# Patient Record
Sex: MALE | Race: WHITE | NOT HISPANIC OR LATINO | Employment: OTHER | RURAL
[De-identification: names, ages, dates, MRNs, and addresses within clinical notes are randomized per-mention and may not be internally consistent; named-entity substitution may affect disease eponyms.]

---

## 2021-08-11 ENCOUNTER — CLINICAL SUPPORT (OUTPATIENT)
Dept: AUDIOLOGY | Facility: CLINIC | Age: 61
End: 2021-08-11
Payer: OTHER GOVERNMENT

## 2021-08-11 DIAGNOSIS — H90.3 SENSORY HEARING LOSS, BILATERAL: Primary | ICD-10-CM

## 2021-08-11 DIAGNOSIS — H90.3 SENSORINEURAL HEARING LOSS (SNHL) OF BOTH EARS: Primary | ICD-10-CM

## 2021-08-11 PROCEDURE — 92557 COMPREHENSIVE HEARING TEST: CPT | Mod: PBBFAC | Performed by: AUDIOLOGIST

## 2021-08-11 PROCEDURE — 99499 NO LOS: ICD-10-PCS | Mod: S$PBB,,, | Performed by: OTOLARYNGOLOGY

## 2021-08-11 PROCEDURE — 92567 TYMPANOMETRY: CPT | Mod: PBBFAC | Performed by: AUDIOLOGIST

## 2021-08-11 PROCEDURE — 99499 UNLISTED E&M SERVICE: CPT | Mod: S$PBB,,, | Performed by: OTOLARYNGOLOGY

## 2021-08-11 PROCEDURE — 99211 OFF/OP EST MAY X REQ PHY/QHP: CPT | Mod: PBBFAC

## 2021-08-11 PROCEDURE — 99202 OFFICE O/P NEW SF 15 MIN: CPT | Mod: PBBFAC | Performed by: AUDIOLOGIST

## 2022-06-08 ENCOUNTER — HOSPITAL ENCOUNTER (OUTPATIENT)
Dept: RADIOLOGY | Facility: HOSPITAL | Age: 62
Discharge: HOME | End: 2022-06-08
Attending: ORTHOPAEDIC SURGERY
Payer: OTHER GOVERNMENT

## 2022-06-08 DIAGNOSIS — M25.562 PAIN IN BOTH KNEES, UNSPECIFIED CHRONICITY: ICD-10-CM

## 2022-06-08 DIAGNOSIS — M25.561 PAIN IN BOTH KNEES, UNSPECIFIED CHRONICITY: ICD-10-CM

## 2022-06-08 PROBLEM — M17.0 PRIMARY OSTEOARTHRITIS OF BOTH KNEES: Status: ACTIVE | Noted: 2022-06-08

## 2022-06-08 PROCEDURE — 73564 X-RAY EXAM KNEE 4 OR MORE: CPT | Mod: TC,50

## 2022-06-21 ENCOUNTER — OFFICE VISIT (OUTPATIENT)
Dept: INTERNAL MEDICINE | Facility: CLINIC | Age: 62
End: 2022-06-21
Payer: OTHER GOVERNMENT

## 2022-06-21 VITALS
BODY MASS INDEX: 31.14 KG/M2 | WEIGHT: 235 LBS | HEIGHT: 73 IN | RESPIRATION RATE: 18 BRPM | SYSTOLIC BLOOD PRESSURE: 122 MMHG | HEART RATE: 98 BPM | OXYGEN SATURATION: 96 % | DIASTOLIC BLOOD PRESSURE: 70 MMHG

## 2022-06-21 DIAGNOSIS — E78.5 DYSLIPIDEMIA: ICD-10-CM

## 2022-06-21 DIAGNOSIS — M17.0 PRIMARY OSTEOARTHRITIS OF BOTH KNEES: ICD-10-CM

## 2022-06-21 DIAGNOSIS — I10 ESSENTIAL HYPERTENSION: ICD-10-CM

## 2022-06-21 DIAGNOSIS — Z01.818 PREOP EXAMINATION: Primary | ICD-10-CM

## 2022-06-21 PROCEDURE — 85730 THROMBOPLASTIN TIME PARTIAL: CPT | Performed by: ORTHOPAEDIC SURGERY

## 2022-06-21 PROCEDURE — 99204 PR OFFICE/OUTPT VISIT, NEW, LEVL IV, 45-59 MIN: ICD-10-PCS | Mod: S$PBB,ICN,, | Performed by: INTERNAL MEDICINE

## 2022-06-21 PROCEDURE — 99204 OFFICE O/P NEW MOD 45 MIN: CPT | Mod: S$PBB,ICN,, | Performed by: INTERNAL MEDICINE

## 2022-06-21 PROCEDURE — 99214 OFFICE O/P EST MOD 30 MIN: CPT | Mod: PBBFAC | Performed by: INTERNAL MEDICINE

## 2022-06-21 PROCEDURE — 85610 PROTHROMBIN TIME: CPT | Performed by: ORTHOPAEDIC SURGERY

## 2022-06-21 RX ORDER — FERROUS SULFATE 325(65) MG
325 TABLET ORAL
COMMUNITY

## 2022-06-21 NOTE — PROGRESS NOTES
Subjective:       Patient ID: Rex Hassan is a 62 y.o. male.    Chief Complaint: Pre-op Exam (Total Knee Arthroplasty by Dr. Joe 07/12/2022)    The patient is a 62-year-old male the presents today for surgical preop risk stratification.  He is scheduled to undergo right total knee replacement with Dr. Joe.  No issues with anesthesia in the past.  He denies coronary artery disease, cerebrovascular disease, CHF, or valvular heart disease.  He denies any chest pain at rest or with moderate exertion.  No family history of premature coronary artery disease.  He denies any abnormal bleeding or clotting.  No known obstructive sleep apnea.  Today he is resting comfortably in no distress.  He is afebrile and vital signs are stable.    Review of Systems   Constitutional: Negative for appetite change, chills and fever.   HENT: Negative for ear pain, hearing loss, sinus pressure/congestion and sore throat.    Eyes: Negative for pain, redness and visual disturbance.   Respiratory: Negative for apnea, cough, shortness of breath and wheezing.    Cardiovascular: Negative for chest pain, palpitations and leg swelling.   Gastrointestinal: Negative for abdominal pain, blood in stool, constipation, diarrhea and nausea.   Endocrine: Negative for cold intolerance, heat intolerance and polyuria.   Genitourinary: Negative for dysuria and hematuria.   Musculoskeletal: Positive for arthralgias. Negative for back pain, joint swelling, myalgias and neck pain.   Integumentary:  Negative for pallor and rash.   Allergic/Immunologic: Negative for frequent infections.   Neurological: Negative for tremors, seizures, weakness and headaches.   Hematological: Negative for adenopathy.   Psychiatric/Behavioral: Negative for confusion, dysphoric mood and sleep disturbance. The patient is not nervous/anxious.          Objective:      Physical Exam  Vitals and nursing note reviewed.   Constitutional:       General: He is not in acute distress.      Appearance: Normal appearance. He is not ill-appearing.   HENT:      Head: Normocephalic and atraumatic.      Right Ear: External ear normal.      Left Ear: External ear normal.      Nose: Nose normal.      Mouth/Throat:      Pharynx: Oropharynx is clear.   Eyes:      Extraocular Movements: Extraocular movements intact.      Conjunctiva/sclera: Conjunctivae normal.      Pupils: Pupils are equal, round, and reactive to light.   Neck:      Vascular: No carotid bruit.   Cardiovascular:      Rate and Rhythm: Normal rate and regular rhythm.      Pulses: Normal pulses.      Heart sounds: Normal heart sounds. No murmur heard.  Pulmonary:      Effort: No respiratory distress.      Breath sounds: Normal breath sounds. No wheezing or rales.   Abdominal:      General: Bowel sounds are normal.      Palpations: Abdomen is soft.   Musculoskeletal:         General: Normal range of motion.      Cervical back: Normal range of motion and neck supple.      Right lower leg: No edema.      Left lower leg: No edema.   Skin:     General: Skin is warm and dry.      Capillary Refill: Capillary refill takes less than 2 seconds.      Coloration: Skin is not pale.   Neurological:      General: No focal deficit present.      Mental Status: He is alert and oriented to person, place, and time.      Cranial Nerves: No cranial nerve deficit.   Psychiatric:         Mood and Affect: Mood normal.         Judgment: Judgment normal.         Assessment:       Problem List Items Addressed This Visit        Cardiac/Vascular    Essential hypertension    Dyslipidemia       Orthopedic    Primary osteoarthritis of both knees      Other Visit Diagnoses     Preop examination    -  Primary          Plan:       1. Right knee osteoarthritis-no known history of coronary artery disease, cerebrovascular disease, CHF, or valvular heart disease.  He denies any chest pain at rest or with moderate exertion.  He is able to perform greater than 4 metabolic equivalents  without any symptoms.  Revised cardiac risk index is class 1.  His ACS-SRC year risk is 2.3% for series complication and 3% for any complication.  I have discussed the risk versus the benefit with the patient.  Overall his risk is considered low for an intermediate risk procedure.  Okay to proceed to surgery without any further testing.  Lab work, chest x-ray, EKG are currently pending.  We will follow-up these results.    2. Essential hypertension-blood pressure is well controlled at 122/70.  He should continue his home medications perioperatively    3. Dyslipidemia-continue with statin

## 2022-06-22 NOTE — NURSING
Spoke with him yesterday, he chose [facility] and will need a RW, BSC post op. Notified Maggie to place order. Received the order today and [facility] to ensure this is all arranged before surgery. Sent to Sabrina Flores in Social service to [facility] with [facility]. Sent to Jaja Saleh with [facility] also since Monet is out this week.

## 2022-06-29 NOTE — NURSING
Spoke with him, he needs EKG, CXR and T&S, he will do those on 7/5 and will bring copy of covid card to Dr Joe's office to be scanned in and will bring card morning of surgery.    7/5 He called today and will do the above on 7/6. I called [facility] and [facility] will be [facility] to his house before 7/12. [facility] is approved.

## 2022-07-06 ENCOUNTER — HOSPITAL ENCOUNTER (OUTPATIENT)
Dept: RADIOLOGY | Facility: HOSPITAL | Age: 62
Discharge: HOME | End: 2022-07-06
Attending: ORTHOPAEDIC SURGERY
Payer: OTHER GOVERNMENT

## 2022-07-06 ENCOUNTER — CLINICAL SUPPORT (OUTPATIENT)
Dept: CARDIOLOGY | Facility: CLINIC | Age: 62
End: 2022-07-06
Payer: OTHER GOVERNMENT

## 2022-07-06 DIAGNOSIS — Z01.810 PRE-OPERATIVE CARDIOVASCULAR EXAMINATION: ICD-10-CM

## 2022-07-06 DIAGNOSIS — Z01.811 PRE-OPERATIVE RESPIRATORY EXAMINATION: ICD-10-CM

## 2022-07-06 PROCEDURE — 93010 EKG 12-LEAD: ICD-10-PCS | Mod: S$PBB,,, | Performed by: STUDENT IN AN ORGANIZED HEALTH CARE EDUCATION/TRAINING PROGRAM

## 2022-07-06 PROCEDURE — 93010 ELECTROCARDIOGRAM REPORT: CPT | Mod: S$PBB,,, | Performed by: STUDENT IN AN ORGANIZED HEALTH CARE EDUCATION/TRAINING PROGRAM

## 2022-07-06 PROCEDURE — 71046 X-RAY EXAM CHEST 2 VIEWS: CPT | Mod: 26,,, | Performed by: RADIOLOGY

## 2022-07-06 PROCEDURE — 71046 XR CHEST PA AND LATERAL: ICD-10-PCS | Mod: 26,,, | Performed by: RADIOLOGY

## 2022-07-06 PROCEDURE — 71046 X-RAY EXAM CHEST 2 VIEWS: CPT | Mod: TC

## 2022-07-06 PROCEDURE — 99212 OFFICE O/P EST SF 10 MIN: CPT | Mod: PBBFAC,25

## 2022-07-06 PROCEDURE — 93005 ELECTROCARDIOGRAM TRACING: CPT | Mod: PBBFAC | Performed by: STUDENT IN AN ORGANIZED HEALTH CARE EDUCATION/TRAINING PROGRAM

## 2022-07-11 ENCOUNTER — ANESTHESIA EVENT (OUTPATIENT)
Dept: SURGERY | Facility: HOSPITAL | Age: 62
End: 2022-07-11
Payer: OTHER GOVERNMENT

## 2022-07-11 NOTE — H&P
[facility] - Orthopedic Periop Services  Orthopedics  H&P    Patient Name: Rex Hassan  MRN: [id number]  Admission Date: (Not on file)  Primary Care Provider: Primary Doctor No    Patient information was obtained from patient and past medical records.     Subjective:     Principal Problem:<principal problem not specified>    Chief Complaint: No chief complaint on file.       HPI: 62-year-old male with severe degenerative joint disease right knee who has failed non operative treatment including injections pain medication he has been walking with a cane for over 5 years his pain is worse weight-bearing interferes with activities in daily living is risk for falls  Right lower extremity moves his toes has sensation to touch has palpable pulses tender to palpation over his medial joint line pain and crepitus on motion no instability the knee is noted he has range of motion 5 to a little past 100°  X-rays show tricompartmental degenerative changes right knee  Impression severe degenerative joint disease right knee  Plan total knee arthroplasty on the right with or without patellar resurfacing      Past Medical History:   Diagnosis Date    Hyperlipidemia     Hypertension        Past Surgical History:   Procedure Laterality Date    HERNIA REPAIR      X2    SHOULDER SURGERY Left        Review of patient's allergies indicates:   Allergen Reactions    Ace inhibitors Anaphylaxis     Other reaction(s): SWELLING-THROAT, SWELLING-LIPS       No current facility-administered medications for this encounter.     Current Outpatient Medications   Medication Sig    aspirin (ECOTRIN) 81 MG EC tablet Take 81 mg by mouth once daily.    cholecalciferol, vitamin D3, 100 mcg (4,000 unit) Tab Take 10 mcg by mouth.    cyanocobalamin (VITAMIN B-12) 1000 MCG tablet Take 1,000 mcg by mouth.    ferrous sulfate (FEOSOL) 325 mg (65 mg iron) Tab tablet Take 325 mg by mouth daily with breakfast.    furosemide (LASIX) 20 MG tablet Take 20 mg by  mouth.    meloxicam (MOBIC) 15 MG tablet Take 15 mg by mouth.    NIFEdipine (ADALAT CC) 90 MG TbSR Take 90 mg by mouth.    potassium chloride (MICRO-K) 10 MEQ CpSR Take 10 mEq by mouth.    rosuvastatin (CRESTOR) 20 MG tablet Take 10 mg by mouth.    sildenafiL (VIAGRA) 100 MG tablet Take 50 mg by mouth.     Family History       Problem Relation (Age of Onset)    Depression Maternal Grandmother    Hypertension Maternal Grandmother          Tobacco Use    Smoking status: Never Smoker    Smokeless tobacco: Never Used   Substance and Sexual Activity    Alcohol use: Yes    Drug use: Never    Sexual activity: Not on file     Review of Systems   Constitutional: Negative for decreased appetite.   HENT:  Negative for congestion and ear discharge.    Eyes:  Negative for blurred vision.   Cardiovascular:  Negative for chest pain and syncope.   Respiratory:  Negative for cough and wheezing.    Endocrine: Negative for cold intolerance and polyuria.   Hematologic/Lymphatic: Negative for adenopathy and bleeding problem.   Skin:  Negative for color change, nail changes and suspicious lesions.   Musculoskeletal:  Positive for joint swelling. Negative for muscle cramps and myalgias.   Gastrointestinal:  Negative for bloating and abdominal pain.   Genitourinary:  Negative for frequency and hematuria.   Neurological:  Negative for brief paralysis, sensory change and weakness.   Psychiatric/Behavioral:  Negative for altered mental status.    Allergic/Immunologic: Negative for hives.   Objective:     Vital Signs (Most Recent):    Vital Signs (24h Range):              There is no height or weight on file to calculate BMI.    No intake or output data in the 24 hours ending 07/11/22 1811              Right Knee Exam     Inspection   Effusion: present    Tenderness   The patient is tender to palpation of the medial joint line and lateral joint line.    Other   Sensation: normal    Vascular Exam     Right Pulses  Dorsalis Pedis:       2+          Significant Labs: All pertinent labs within the past 24 hours have been reviewed.    Significant Imaging: I have reviewed all pertinent imaging results/findings.    Assessment/Plan:     No notes have been filed under this hospital service.  Service: Orthopedic Surgery      Rex Joe MD  Orthopedics  [facility] - Orthopedic Periop Services

## 2022-07-11 NOTE — SUBJECTIVE & OBJECTIVE
Past Medical History:   Diagnosis Date    Hyperlipidemia     Hypertension        Past Surgical History:   Procedure Laterality Date    HERNIA REPAIR      X2    SHOULDER SURGERY Left        Review of patient's allergies indicates:   Allergen Reactions    Ace inhibitors Anaphylaxis     Other reaction(s): SWELLING-THROAT, SWELLING-LIPS       No current facility-administered medications for this encounter.     Current Outpatient Medications   Medication Sig    aspirin (ECOTRIN) 81 MG EC tablet Take 81 mg by mouth once daily.    cholecalciferol, vitamin D3, 100 mcg (4,000 unit) Tab Take 10 mcg by mouth.    cyanocobalamin (VITAMIN B-12) 1000 MCG tablet Take 1,000 mcg by mouth.    ferrous sulfate (FEOSOL) 325 mg (65 mg iron) Tab tablet Take 325 mg by mouth daily with breakfast.    furosemide (LASIX) 20 MG tablet Take 20 mg by mouth.    meloxicam (MOBIC) 15 MG tablet Take 15 mg by mouth.    NIFEdipine (ADALAT CC) 90 MG TbSR Take 90 mg by mouth.    potassium chloride (MICRO-K) 10 MEQ CpSR Take 10 mEq by mouth.    rosuvastatin (CRESTOR) 20 MG tablet Take 10 mg by mouth.    sildenafiL (VIAGRA) 100 MG tablet Take 50 mg by mouth.     Family History       Problem Relation (Age of Onset)    Depression Maternal Grandmother    Hypertension Maternal Grandmother          Tobacco Use    Smoking status: Never Smoker    Smokeless tobacco: Never Used   Substance and Sexual Activity    Alcohol use: Yes    Drug use: Never    Sexual activity: Not on file     Review of Systems   Constitutional: Negative for decreased appetite.   HENT:  Negative for congestion and ear discharge.    Eyes:  Negative for blurred vision.   Cardiovascular:  Negative for chest pain and syncope.   Respiratory:  Negative for cough and wheezing.    Endocrine: Negative for cold intolerance and polyuria.   Hematologic/Lymphatic: Negative for adenopathy and bleeding problem.   Skin:  Negative for color change, nail changes and suspicious lesions.   Musculoskeletal:   Positive for joint swelling. Negative for muscle cramps and myalgias.   Gastrointestinal:  Negative for bloating and abdominal pain.   Genitourinary:  Negative for frequency and hematuria.   Neurological:  Negative for brief paralysis, sensory change and weakness.   Psychiatric/Behavioral:  Negative for altered mental status.    Allergic/Immunologic: Negative for hives.   Objective:     Vital Signs (Most Recent):    Vital Signs (24h Range):              There is no height or weight on file to calculate BMI.    No intake or output data in the 24 hours ending 07/11/22 1811              Right Knee Exam     Inspection   Effusion: present    Tenderness   The patient is tender to palpation of the medial joint line and lateral joint line.    Other   Sensation: normal    Vascular Exam     Right Pulses  Dorsalis Pedis:      2+          Significant Labs: All pertinent labs within the past 24 hours have been reviewed.    Significant Imaging: I have reviewed all pertinent imaging results/findings.

## 2022-07-11 NOTE — HPI
62-year-old male with severe degenerative joint disease right knee who has failed non operative treatment including injections pain medication he has been walking with a cane for over 5 years his pain is worse weight-bearing interferes with activities in daily living is risk for falls  Right lower extremity moves his toes has sensation to touch has palpable pulses tender to palpation over his medial joint line pain and crepitus on motion no instability the knee is noted he has range of motion 5 to a little past 100°  X-rays show tricompartmental degenerative changes right knee  Impression severe degenerative joint disease right knee  Plan total knee arthroplasty on the right with or without patellar resurfacing   Patient returned call. Informed him instructions will not be able to get to him in time through the mail. He is willing to come to clinic to  the instructions. Instructed where to go. Verbalized understanding.

## 2022-07-11 NOTE — ANESTHESIA PREPROCEDURE EVALUATION
"                                                                                                             07/11/2022  Rex Hassan is a 62 y.o., male.      Pre-op Assessment    I have reviewed the Patient Summary Reports.       I have reviewed the Medications.     Review of Systems         Anesthesia Plan  Type of Anesthesia, risks & benefits discussed:    Anesthesia Type: Gen Supraglottic Airway, Regional  Intra-op Monitoring Plan: Standard ASA Monitors  Post Op Pain Control Plan: multimodal analgesia  Induction:  IV  Informed Consent: Informed consent signed with the Patient and all parties understand the risks and agree with anesthesia plan.  All questions answered.   ASA Score: 2    Ready For Surgery From Anesthesia Perspective.     .  No known anesthetic complications  Allergic to ACE-inhibitors  NPO greater than 8 hours    Hct 41  7/6/22 EKG: Normal sinus rhythm 96 bpm;   Nonspecific T wave abnormality   Prolonged QT    Hypertension Hyperlipidemia   Surgical clearance from Dr. Imtiaz Harrison is on the chart: "Overall his risk is considered low for an intermediate risk procedure.  Okay to proceed to surgery without any further testing."    Airway exam deferred (COVID precautions); adequate ROM at neck.        "

## 2022-07-12 ENCOUNTER — HOSPITAL ENCOUNTER (OUTPATIENT)
Facility: HOSPITAL | Age: 62
Discharge: HOME HEALTH | End: 2022-07-13
Attending: ORTHOPAEDIC SURGERY | Admitting: ORTHOPAEDIC SURGERY
Payer: OTHER GOVERNMENT

## 2022-07-12 ENCOUNTER — ANESTHESIA (OUTPATIENT)
Dept: SURGERY | Facility: HOSPITAL | Age: 62
End: 2022-07-12
Payer: OTHER GOVERNMENT

## 2022-07-12 DIAGNOSIS — M17.0 PRIMARY OSTEOARTHRITIS OF BOTH KNEES: ICD-10-CM

## 2022-07-12 LAB
ANION GAP SERPL CALCULATED.3IONS-SCNC: 9 MMOL/L (ref 7–16)
ANISOCYTOSIS BLD QL SMEAR: ABNORMAL
BASOPHILS # BLD AUTO: 0.01 K/UL (ref 0–0.2)
BASOPHILS NFR BLD AUTO: 0.2 % (ref 0–1)
BUN SERPL-MCNC: 17 MG/DL (ref 7–18)
BUN/CREAT SERPL: 15 (ref 6–20)
CALCIUM SERPL-MCNC: 8.5 MG/DL (ref 8.5–10.1)
CHLORIDE SERPL-SCNC: 108 MMOL/L (ref 98–107)
CO2 SERPL-SCNC: 28 MMOL/L (ref 21–32)
CREAT SERPL-MCNC: 1.11 MG/DL (ref 0.7–1.3)
DIFFERENTIAL METHOD BLD: ABNORMAL
EOSINOPHIL # BLD AUTO: 0.04 K/UL (ref 0–0.5)
EOSINOPHIL NFR BLD AUTO: 0.8 % (ref 1–4)
ERYTHROCYTE [DISTWIDTH] IN BLOOD BY AUTOMATED COUNT: 14.8 % (ref 11.5–14.5)
GLUCOSE SERPL-MCNC: 125 MG/DL (ref 74–106)
HCT VFR BLD AUTO: 35.5 % (ref 40–54)
HGB BLD-MCNC: 11.7 G/DL (ref 13.5–18)
IMM GRANULOCYTES # BLD AUTO: 0.03 K/UL (ref 0–0.04)
IMM GRANULOCYTES NFR BLD: 0.6 % (ref 0–0.4)
LYMPHOCYTES # BLD AUTO: 1.43 K/UL (ref 1–4.8)
LYMPHOCYTES NFR BLD AUTO: 27.9 % (ref 27–41)
MCH RBC QN AUTO: 28.7 PG (ref 27–31)
MCHC RBC AUTO-ENTMCNC: 33 G/DL (ref 32–36)
MCV RBC AUTO: 87 FL (ref 80–96)
MONOCYTES # BLD AUTO: 0.21 K/UL (ref 0–0.8)
MONOCYTES NFR BLD AUTO: 4.1 % (ref 2–6)
MPC BLD CALC-MCNC: ABNORMAL G/DL
NEUTROPHILS # BLD AUTO: 3.4 K/UL (ref 1.8–7.7)
NEUTROPHILS NFR BLD AUTO: 66.4 % (ref 53–65)
NRBC # BLD AUTO: 0 X10E3/UL
NRBC, AUTO (.00): 0 %
OVALOCYTES BLD QL SMEAR: ABNORMAL
PLATELET # BLD AUTO: 200 K/UL (ref 150–400)
PLATELET MORPHOLOGY: ABNORMAL
POTASSIUM SERPL-SCNC: 3.9 MMOL/L (ref 3.5–5.1)
RBC # BLD AUTO: 4.08 M/UL (ref 4.6–6.2)
SODIUM SERPL-SCNC: 141 MMOL/L (ref 136–145)
WBC # BLD AUTO: 5.12 K/UL (ref 4.5–11)

## 2022-07-12 PROCEDURE — 63600175 PHARM REV CODE 636 W HCPCS: Performed by: ANESTHESIOLOGY

## 2022-07-12 PROCEDURE — 36000713 HC OR TIME LEV V EA ADD 15 MIN: Performed by: ORTHOPAEDIC SURGERY

## 2022-07-12 PROCEDURE — 01402 ANES OPN/ARTH TOT KNE ARTHRP: CPT | Performed by: ORTHOPAEDIC SURGERY

## 2022-07-12 PROCEDURE — C1769 GUIDE WIRE: HCPCS | Performed by: ORTHOPAEDIC SURGERY

## 2022-07-12 PROCEDURE — D9220A PRA ANESTHESIA: ICD-10-PCS | Mod: ANES,,, | Performed by: ANESTHESIOLOGY

## 2022-07-12 PROCEDURE — 99220 PR INITIAL OBSERVATION CARE,LEVL III: CPT | Mod: GC,,, | Performed by: INTERNAL MEDICINE

## 2022-07-12 PROCEDURE — D9220A PRA ANESTHESIA: Mod: CRNA,,, | Performed by: NURSE ANESTHETIST, CERTIFIED REGISTERED

## 2022-07-12 PROCEDURE — 27000689 HC BLADE LARYNGOSCOPE ANY SIZE: Performed by: ANESTHESIOLOGY

## 2022-07-12 PROCEDURE — 99900035 HC TECH TIME PER 15 MIN (STAT)

## 2022-07-12 PROCEDURE — 71000033 HC RECOVERY, INTIAL HOUR: Performed by: ORTHOPAEDIC SURGERY

## 2022-07-12 PROCEDURE — 36415 COLL VENOUS BLD VENIPUNCTURE: CPT | Performed by: ORTHOPAEDIC SURGERY

## 2022-07-12 PROCEDURE — 27000510 HC BLANKET BAIR HUGGER ANY SIZE: Performed by: ANESTHESIOLOGY

## 2022-07-12 PROCEDURE — 27201423 OPTIME MED/SURG SUP & DEVICES STERILE SUPPLY: Performed by: ORTHOPAEDIC SURGERY

## 2022-07-12 PROCEDURE — 99220 PR INITIAL OBSERVATION CARE,LEVL III: ICD-10-PCS | Mod: GC,,, | Performed by: INTERNAL MEDICINE

## 2022-07-12 PROCEDURE — D9220A PRA ANESTHESIA: Mod: ANES,,, | Performed by: ANESTHESIOLOGY

## 2022-07-12 PROCEDURE — 37000008 HC ANESTHESIA 1ST 15 MINUTES: Performed by: ORTHOPAEDIC SURGERY

## 2022-07-12 PROCEDURE — 80048 BASIC METABOLIC PNL TOTAL CA: CPT | Performed by: ORTHOPAEDIC SURGERY

## 2022-07-12 PROCEDURE — 27000165 HC TUBE, ETT CUFFED: Performed by: ANESTHESIOLOGY

## 2022-07-12 PROCEDURE — 37000009 HC ANESTHESIA EA ADD 15 MINS: Performed by: ORTHOPAEDIC SURGERY

## 2022-07-12 PROCEDURE — 27000716 HC OXISENSOR PROBE, ANY SIZE: Performed by: ANESTHESIOLOGY

## 2022-07-12 PROCEDURE — 97166 OT EVAL MOD COMPLEX 45 MIN: CPT

## 2022-07-12 PROCEDURE — 85025 COMPLETE CBC W/AUTO DIFF WBC: CPT | Performed by: ORTHOPAEDIC SURGERY

## 2022-07-12 PROCEDURE — 25000003 PHARM REV CODE 250: Performed by: NURSE ANESTHETIST, CERTIFIED REGISTERED

## 2022-07-12 PROCEDURE — 64447 NJX AA&/STRD FEMORAL NRV IMG: CPT | Mod: XU,RT,, | Performed by: ANESTHESIOLOGY

## 2022-07-12 PROCEDURE — 64447 PERIPHERAL BLOCK: ICD-10-PCS | Mod: XU,RT,, | Performed by: ANESTHESIOLOGY

## 2022-07-12 PROCEDURE — 63600175 PHARM REV CODE 636 W HCPCS: Performed by: ORTHOPAEDIC SURGERY

## 2022-07-12 PROCEDURE — 27100168 OPTIME MED/SURG SUP & DEVICES NON-STERILE SUPPLY: Performed by: ORTHOPAEDIC SURGERY

## 2022-07-12 PROCEDURE — 27200700 HC TUBE, ENDOTRACHEAL NASAL RAE: Performed by: ANESTHESIOLOGY

## 2022-07-12 PROCEDURE — 97161 PT EVAL LOW COMPLEX 20 MIN: CPT

## 2022-07-12 PROCEDURE — C1713 ANCHOR/SCREW BN/BN,TIS/BN: HCPCS | Performed by: ORTHOPAEDIC SURGERY

## 2022-07-12 PROCEDURE — 36000712 HC OR TIME LEV V 1ST 15 MIN: Performed by: ORTHOPAEDIC SURGERY

## 2022-07-12 PROCEDURE — 27200750 HC INSULATED NEEDLE/ STIMUPLEX: Performed by: ANESTHESIOLOGY

## 2022-07-12 PROCEDURE — 27000655: Performed by: ANESTHESIOLOGY

## 2022-07-12 PROCEDURE — 25000003 PHARM REV CODE 250: Performed by: ORTHOPAEDIC SURGERY

## 2022-07-12 PROCEDURE — 27000284 HC CANNULA NASAL: Performed by: ANESTHESIOLOGY

## 2022-07-12 PROCEDURE — 63600175 PHARM REV CODE 636 W HCPCS: Performed by: NURSE ANESTHETIST, CERTIFIED REGISTERED

## 2022-07-12 PROCEDURE — D9220A PRA ANESTHESIA: ICD-10-PCS | Mod: CRNA,,, | Performed by: NURSE ANESTHETIST, CERTIFIED REGISTERED

## 2022-07-12 PROCEDURE — 25000003 PHARM REV CODE 250: Performed by: ANESTHESIOLOGY

## 2022-07-12 PROCEDURE — 94761 N-INVAS EAR/PLS OXIMETRY MLT: CPT

## 2022-07-12 PROCEDURE — C1776 JOINT DEVICE (IMPLANTABLE): HCPCS | Performed by: ORTHOPAEDIC SURGERY

## 2022-07-12 DEVICE — IMPLANTABLE DEVICE: Type: IMPLANTABLE DEVICE | Site: KNEE | Status: FUNCTIONAL

## 2022-07-12 DEVICE — CEMENT BONE SIMPLEX P SURGICAL: Type: IMPLANTABLE DEVICE | Site: KNEE | Status: FUNCTIONAL

## 2022-07-12 DEVICE — IMP PATELLA ASYMMETRIC X3: Type: IMPLANTABLE DEVICE | Site: KNEE | Status: FUNCTIONAL

## 2022-07-12 RX ORDER — SODIUM CHLORIDE 9 MG/ML
INJECTION, SOLUTION INTRAVENOUS CONTINUOUS
Status: DISCONTINUED | OUTPATIENT
Start: 2022-07-12 | End: 2022-07-12

## 2022-07-12 RX ORDER — NIFEDIPINE 30 MG/1
30 TABLET, EXTENDED RELEASE ORAL DAILY
Status: DISCONTINUED | OUTPATIENT
Start: 2022-07-12 | End: 2022-07-13 | Stop reason: HOSPADM

## 2022-07-12 RX ORDER — FENTANYL CITRATE 50 UG/ML
INJECTION, SOLUTION INTRAMUSCULAR; INTRAVENOUS
Status: DISCONTINUED | OUTPATIENT
Start: 2022-07-12 | End: 2022-07-12

## 2022-07-12 RX ORDER — FUROSEMIDE 20 MG/1
20 TABLET ORAL DAILY
Status: DISCONTINUED | OUTPATIENT
Start: 2022-07-12 | End: 2022-07-13 | Stop reason: HOSPADM

## 2022-07-12 RX ORDER — SODIUM CHLORIDE 9 MG/ML
75 INJECTION, SOLUTION INTRAVENOUS CONTINUOUS
Status: DISCONTINUED | OUTPATIENT
Start: 2022-07-12 | End: 2022-07-13 | Stop reason: HOSPADM

## 2022-07-12 RX ORDER — GLYCOPYRROLATE 0.2 MG/ML
INJECTION INTRAMUSCULAR; INTRAVENOUS
Status: DISCONTINUED | OUTPATIENT
Start: 2022-07-12 | End: 2022-07-12

## 2022-07-12 RX ORDER — PHENYLEPHRINE HYDROCHLORIDE 10 MG/ML
INJECTION INTRAVENOUS
Status: DISCONTINUED | OUTPATIENT
Start: 2022-07-12 | End: 2022-07-12

## 2022-07-12 RX ORDER — PROPOFOL 10 MG/ML
VIAL (ML) INTRAVENOUS
Status: DISCONTINUED | OUTPATIENT
Start: 2022-07-12 | End: 2022-07-12

## 2022-07-12 RX ORDER — MORPHINE SULFATE 8 MG/ML
4 INJECTION INTRAMUSCULAR; INTRAVENOUS; SUBCUTANEOUS EVERY 5 MIN PRN
Status: DISCONTINUED | OUTPATIENT
Start: 2022-07-12 | End: 2022-07-12 | Stop reason: HOSPADM

## 2022-07-12 RX ORDER — MIDAZOLAM HYDROCHLORIDE 5 MG/ML
INJECTION INTRAMUSCULAR; INTRAVENOUS
Status: DISCONTINUED | OUTPATIENT
Start: 2022-07-12 | End: 2022-07-12

## 2022-07-12 RX ORDER — ONDANSETRON 2 MG/ML
4 INJECTION INTRAMUSCULAR; INTRAVENOUS EVERY 8 HOURS PRN
Status: DISCONTINUED | OUTPATIENT
Start: 2022-07-12 | End: 2022-07-13 | Stop reason: HOSPADM

## 2022-07-12 RX ORDER — DEXAMETHASONE SODIUM PHOSPHATE 4 MG/ML
INJECTION, SOLUTION INTRA-ARTICULAR; INTRALESIONAL; INTRAMUSCULAR; INTRAVENOUS; SOFT TISSUE
Status: DISCONTINUED | OUTPATIENT
Start: 2022-07-12 | End: 2022-07-12

## 2022-07-12 RX ORDER — ROPIVACAINE HYDROCHLORIDE 7.5 MG/ML
INJECTION, SOLUTION EPIDURAL; PERINEURAL
Status: COMPLETED | OUTPATIENT
Start: 2022-07-12 | End: 2022-07-12

## 2022-07-12 RX ORDER — ONDANSETRON 2 MG/ML
INJECTION INTRAMUSCULAR; INTRAVENOUS
Status: DISCONTINUED | OUTPATIENT
Start: 2022-07-12 | End: 2022-07-12

## 2022-07-12 RX ORDER — ATORVASTATIN CALCIUM 40 MG/1
40 TABLET, FILM COATED ORAL DAILY
Status: DISCONTINUED | OUTPATIENT
Start: 2022-07-13 | End: 2022-07-13 | Stop reason: HOSPADM

## 2022-07-12 RX ORDER — ACETAMINOPHEN 500 MG
1000 TABLET ORAL ONCE
Status: COMPLETED | OUTPATIENT
Start: 2022-07-12 | End: 2022-07-12

## 2022-07-12 RX ORDER — CEFAZOLIN SODIUM 2 G/50ML
2 SOLUTION INTRAVENOUS
Status: COMPLETED | OUTPATIENT
Start: 2022-07-12 | End: 2022-07-13

## 2022-07-12 RX ORDER — DIPHENHYDRAMINE HYDROCHLORIDE 50 MG/ML
25 INJECTION INTRAMUSCULAR; INTRAVENOUS EVERY 6 HOURS PRN
Status: DISCONTINUED | OUTPATIENT
Start: 2022-07-12 | End: 2022-07-12 | Stop reason: HOSPADM

## 2022-07-12 RX ORDER — ASPIRIN 81 MG/1
81 TABLET ORAL DAILY
Status: DISCONTINUED | OUTPATIENT
Start: 2022-07-12 | End: 2022-07-13 | Stop reason: HOSPADM

## 2022-07-12 RX ORDER — ONDANSETRON 2 MG/ML
4 INJECTION INTRAMUSCULAR; INTRAVENOUS DAILY PRN
Status: DISCONTINUED | OUTPATIENT
Start: 2022-07-12 | End: 2022-07-12 | Stop reason: HOSPADM

## 2022-07-12 RX ORDER — MORPHINE SULFATE 4 MG/ML
4 INJECTION, SOLUTION INTRAMUSCULAR; INTRAVENOUS EVERY 4 HOURS PRN
Status: DISCONTINUED | OUTPATIENT
Start: 2022-07-12 | End: 2022-07-13 | Stop reason: HOSPADM

## 2022-07-12 RX ORDER — HYDROMORPHONE HYDROCHLORIDE 2 MG/ML
0.5 INJECTION, SOLUTION INTRAMUSCULAR; INTRAVENOUS; SUBCUTANEOUS EVERY 5 MIN PRN
Status: DISCONTINUED | OUTPATIENT
Start: 2022-07-12 | End: 2022-07-12 | Stop reason: HOSPADM

## 2022-07-12 RX ORDER — TRANEXAMIC ACID 100 MG/ML
INJECTION, SOLUTION INTRAVENOUS
Status: DISCONTINUED | OUTPATIENT
Start: 2022-07-12 | End: 2022-07-12

## 2022-07-12 RX ORDER — DOCUSATE SODIUM 100 MG/1
100 CAPSULE, LIQUID FILLED ORAL EVERY 12 HOURS
Status: DISCONTINUED | OUTPATIENT
Start: 2022-07-12 | End: 2022-07-13 | Stop reason: HOSPADM

## 2022-07-12 RX ORDER — LIDOCAINE HYDROCHLORIDE 20 MG/ML
INJECTION, SOLUTION EPIDURAL; INFILTRATION; INTRACAUDAL; PERINEURAL
Status: DISCONTINUED | OUTPATIENT
Start: 2022-07-12 | End: 2022-07-12

## 2022-07-12 RX ORDER — ROCURONIUM BROMIDE 50 MG/5 ML
SYRINGE (ML) INTRAVENOUS
Status: DISCONTINUED | OUTPATIENT
Start: 2022-07-12 | End: 2022-07-12

## 2022-07-12 RX ORDER — MEPERIDINE HYDROCHLORIDE 25 MG/ML
25 INJECTION INTRAMUSCULAR; INTRAVENOUS; SUBCUTANEOUS EVERY 10 MIN PRN
Status: DISCONTINUED | OUTPATIENT
Start: 2022-07-12 | End: 2022-07-12 | Stop reason: HOSPADM

## 2022-07-12 RX ORDER — HYDROCODONE BITARTRATE AND ACETAMINOPHEN 5; 325 MG/1; MG/1
1 TABLET ORAL EVERY 4 HOURS PRN
Status: DISCONTINUED | OUTPATIENT
Start: 2022-07-12 | End: 2022-07-13 | Stop reason: HOSPADM

## 2022-07-12 RX ORDER — ATORVASTATIN CALCIUM 10 MG/1
10 TABLET, FILM COATED ORAL DAILY
Status: DISCONTINUED | OUTPATIENT
Start: 2022-07-12 | End: 2022-07-12

## 2022-07-12 RX ORDER — CEFAZOLIN SODIUM 2 G/50ML
2 SOLUTION INTRAVENOUS
Status: COMPLETED | OUTPATIENT
Start: 2022-07-12 | End: 2022-07-12

## 2022-07-12 RX ORDER — BISACODYL 10 MG
10 SUPPOSITORY, RECTAL RECTAL DAILY PRN
Status: DISCONTINUED | OUTPATIENT
Start: 2022-07-12 | End: 2022-07-13 | Stop reason: HOSPADM

## 2022-07-12 RX ORDER — EPHEDRINE SULFATE 50 MG/ML
INJECTION, SOLUTION INTRAVENOUS
Status: DISCONTINUED | OUTPATIENT
Start: 2022-07-12 | End: 2022-07-12

## 2022-07-12 RX ORDER — POTASSIUM CHLORIDE 20 MEQ/1
20 TABLET, EXTENDED RELEASE ORAL DAILY
Status: DISCONTINUED | OUTPATIENT
Start: 2022-07-12 | End: 2022-07-13 | Stop reason: HOSPADM

## 2022-07-12 RX ADMIN — Medication 50 MG: at 07:07

## 2022-07-12 RX ADMIN — EPHEDRINE SULFATE 10 MG: 50 INJECTION INTRAVENOUS at 08:07

## 2022-07-12 RX ADMIN — ATORVASTATIN CALCIUM 10 MG: 10 TABLET, FILM COATED ORAL at 12:07

## 2022-07-12 RX ADMIN — GLYCOPYRROLATE 0.2 MG: 0.2 INJECTION INTRAMUSCULAR; INTRAVENOUS at 08:07

## 2022-07-12 RX ADMIN — VANCOMYCIN HYDROCHLORIDE 1000 MG: 1 INJECTION, POWDER, LYOPHILIZED, FOR SOLUTION INTRAVENOUS at 09:07

## 2022-07-12 RX ADMIN — PROPOFOL 150 MG: 10 INJECTION, EMULSION INTRAVENOUS at 07:07

## 2022-07-12 RX ADMIN — HYDROCODONE BITARTRATE AND ACETAMINOPHEN 1 TABLET: 5; 325 TABLET ORAL at 05:07

## 2022-07-12 RX ADMIN — FENTANYL CITRATE 100 MCG: 50 INJECTION INTRAMUSCULAR; INTRAVENOUS at 07:07

## 2022-07-12 RX ADMIN — CEFAZOLIN SODIUM 2 G: 10 INJECTION, POWDER, FOR SOLUTION INTRAVENOUS at 05:07

## 2022-07-12 RX ADMIN — PHENYLEPHRINE HYDROCHLORIDE 100 MCG: 10 INJECTION INTRAVENOUS at 10:07

## 2022-07-12 RX ADMIN — POTASSIUM CHLORIDE 20 MEQ: 1500 TABLET, EXTENDED RELEASE ORAL at 12:07

## 2022-07-12 RX ADMIN — ASPIRIN 81 MG: 81 TABLET, COATED ORAL at 12:07

## 2022-07-12 RX ADMIN — SODIUM CHLORIDE: 9 INJECTION, SOLUTION INTRAVENOUS at 06:07

## 2022-07-12 RX ADMIN — Medication 10 MG: at 09:07

## 2022-07-12 RX ADMIN — HYDROCODONE BITARTRATE AND ACETAMINOPHEN 1 TABLET: 5; 325 TABLET ORAL at 01:07

## 2022-07-12 RX ADMIN — MORPHINE SULFATE 4 MG: 4 INJECTION INTRAVENOUS at 09:07

## 2022-07-12 RX ADMIN — TRANEXAMIC ACID 1000 MG: 100 INJECTION, SOLUTION INTRAVENOUS at 07:07

## 2022-07-12 RX ADMIN — SUGAMMADEX 200 MG: 100 INJECTION, SOLUTION INTRAVENOUS at 10:07

## 2022-07-12 RX ADMIN — CEFAZOLIN SODIUM 2 G: 1 INJECTION, POWDER, FOR SOLUTION INTRAMUSCULAR; INTRAVENOUS at 07:07

## 2022-07-12 RX ADMIN — ROPIVACAINE HYDROCHLORIDE 40 ML: 7.5 INJECTION, SOLUTION EPIDURAL; PERINEURAL at 08:07

## 2022-07-12 RX ADMIN — SODIUM CHLORIDE 75 ML/HR: 9 INJECTION, SOLUTION INTRAVENOUS at 07:07

## 2022-07-12 RX ADMIN — PHENYLEPHRINE HYDROCHLORIDE 100 MCG: 10 INJECTION INTRAVENOUS at 08:07

## 2022-07-12 RX ADMIN — EPHEDRINE SULFATE 15 MG: 50 INJECTION INTRAVENOUS at 08:07

## 2022-07-12 RX ADMIN — ACETAMINOPHEN 1000 MG: 500 TABLET ORAL at 07:07

## 2022-07-12 RX ADMIN — LIDOCAINE HYDROCHLORIDE 100 MG: 20 INJECTION, SOLUTION EPIDURAL; INFILTRATION; INTRACAUDAL; PERINEURAL at 07:07

## 2022-07-12 RX ADMIN — FUROSEMIDE 20 MG: 20 TABLET ORAL at 12:07

## 2022-07-12 RX ADMIN — MIDAZOLAM HYDROCHLORIDE 2 MG: 5 INJECTION, SOLUTION INTRAMUSCULAR; INTRAVENOUS at 07:07

## 2022-07-12 RX ADMIN — DEXAMETHASONE SODIUM PHOSPHATE 8 MG: 4 INJECTION, SOLUTION INTRA-ARTICULAR; INTRALESIONAL; INTRAMUSCULAR; INTRAVENOUS; SOFT TISSUE at 07:07

## 2022-07-12 RX ADMIN — ONDANSETRON 8 MG: 2 INJECTION INTRAMUSCULAR; INTRAVENOUS at 07:07

## 2022-07-12 RX ADMIN — VANCOMYCIN HYDROCHLORIDE 2000 MG: 1 INJECTION, POWDER, LYOPHILIZED, FOR SOLUTION INTRAVENOUS at 07:07

## 2022-07-12 RX ADMIN — TRANEXAMIC ACID 1000 MG: 100 INJECTION, SOLUTION INTRAVENOUS at 10:07

## 2022-07-12 RX ADMIN — NIFEDIPINE 30 MG: 30 TABLET, FILM COATED, EXTENDED RELEASE ORAL at 12:07

## 2022-07-12 RX ADMIN — DOCUSATE SODIUM 100 MG: 100 CAPSULE, LIQUID FILLED ORAL at 12:07

## 2022-07-12 NOTE — TRANSFER OF CARE
"Anesthesia Transfer of Care Note    Patient: Rex Hassan    Procedure(s) Performed: Procedure(s) (LRB):  ARTHROPLASTY, KNEE, TOTAL, USING COMPUTER-ASSISTED NAVIGATION (Right)    Patient location: PACU    Anesthesia Type: general and regional    Transport from OR: Transported from OR on 100% O2 by closed face mask with adequate spontaneous ventilation    Post pain: adequate analgesia    Post assessment: no apparent anesthetic complications    Post vital signs: stable    Level of consciousness: responds to stimulation    Nausea/Vomiting: no nausea/vomiting    Complications: none    Transfer of care protocol was followed      Last vitals:   Visit Vitals  /79 (BP Location: Left arm, Patient Position: Lying)   Pulse 74   Temp 37 °C (98.6 °F) (Oral)   Resp 16   Ht 6' 1" (1.854 m)   Wt 110.7 kg (244 lb)   SpO2 (!) 94%   BMI 32.19 kg/m²     "

## 2022-07-12 NOTE — PLAN OF CARE
Short Term Goals  Independent with HEP  Independent with walkerx 100 feet FWB/WBAT: right lower extremity    Long term goals  Needed equipment for home.     Problem: Physical Therapy  Goal: Physical Therapy Goal  Outcome: Ongoing, Progressing

## 2022-07-12 NOTE — OP NOTE
[facility] - Orthopedic Periop Services  General Surgery  Operative Note    SUMMARY     Date of Procedure: 7/12/2022     Procedure: Procedure(s) (LRB):  ARTHROPLASTY, KNEE, TOTAL, USING COMPUTER-ASSISTED NAVIGATION (Right)       Surgeon(s) and Role:     * Rex Joe MD - Primary    Assisting Surgeon: None    Pre-Operative Diagnosis: Pain in both knees, unspecified chronicity [M25.561, M25.562]  Primary osteoarthritis of both knees [M17.0]    Post-Operative Diagnosis: Post-Op Diagnosis Codes:     * Pain in both knees, unspecified chronicity [M25.561, M25.562]     * Primary osteoarthritis of both knees [M17.0]    Anesthesia: Choice    Operative Findings (including complications, if any):           OPERATIVE REPORT    SURGERY DATE:  7/12/2022    PRE-OP DIAGNOSIS:  Pain in both knees, unspecified chronicity [M25.561, M25.562]  Primary osteoarthritis of both knees [M17.0]    POST-OP DIAGNOSIS:  Post-Op Diagnosis Codes:     * Pain in both knees, unspecified chronicity [M25.561, M25.562]     * Primary osteoarthritis of both knees [M17.0]    PROCEDURE:  Procedure(s) (LRB):  ARTHROPLASTY, KNEE, TOTAL, USING COMPUTER-ASSISTED NAVIGATION (Right)    SURGEON:  Rex Joe M.D.    Assisting Surgeon:      ANESTHESIA:  Choice    BLOOD LOSS:  100cc    TOURNIQUET TIME:  82min    COMPLICATIONS:  None.    IMPLANTS PLACED:    Implant Name Type Inv. Item Serial No.  Lot No. LRB No. Used Action   CEMENT BONE SIMPLEX P SURGICAL - [id number] Cement CEMENT BONE SIMPLEX P SURGICAL  [employer] ([facility]) [id number] Right 1 Implanted   CEMENT BONE SIMPLEX P SURGICAL - [id number] Cement CEMENT BONE SIMPLEX P SURGICAL  [employer] ([facility]) [id number] Right 1 Implanted   GUIDEPIN 3.20MM 4 GUCCI SQUARE - [id number]  GUIDEPIN 3.20MM 4 GUCCI [facility] ([facility])  Right 1 Implanted and Explanted   GUIDEPIN 3.20MM 4 GUCCI SQUARE - [id number]  GUIDEPIN 3.20MM 4 GUCCI [facility] ([facility])  Right 1  Implanted and Explanted   IMP BASEPLATE TIBIAL CEMENTED #7 - [id number] Prosthesis IMP BASEPLATE TIBIAL CEMENTED #7  [employer] ([facility]) [id number] Right 1 Implanted   IMP COMPONENT FEMORAL SZ 6 RT - [id number] Prosthesis IMP COMPONENT FEMORAL SZ 6 RT  [employer] ([facility]) [id number] Right 1 Implanted   IMP PATELLA ASYMMETRIC X3 - [id number] Prosthesis IMP PATELLA ASYMMETRIC X3  [employer] ([facility]) [id number] Right 1 Implanted   TIBIAL BEARING INSERT     [id number] Right 1 Implanted       INDICATIONS:  Patient is a 62 y.o. year old male with severe degenerative joint disease of the right knee needing total knee arthroplasty.    PROCEDURE IN DETAIL:  After having the risks and benefits of the procedure explained at length to the patient and the patient stating that he understands the risks and benefits of the procedure and wishes to proceed with the procedure, written informed consent was obtained.  The patient was taken to the Operating room and placed on the Operative table at which time Choice was induced per anesthesia.  The patient then had a sandbag taped on the bed so the knee could be held at 90 degrees of flexion.  A tourniquet was placed over cast padding on the proximal right thigh.  The right lower extremity was prepped and draped in sterile fashion.  It was elevated and exsanguinated with an Esmarch bandage.    At this time a 15 centimeter incision was made centered over the patella going from the tibial tubercle mid-line to approximately 4-5 centimeters superior to the superior pole of the patella.  Skin incision was made with a #10 blade and was taken down to the anterior retinaculum of the patella.  At this point a medial flap was elevated up using the #10 blade.  At this point the knee was flexed up and the quadriceps tendon was identified and a medial arthrotomy starting approximately a centimeter superior to the superior pole of the patella and going over the  media border of the patella and the medial border of the patella going down to the tibial tubercle was made using a #10 blade. At this point the joint opened up and there was noted to be severe DJD of the knee.  The knee was then flexed up.  The remnants of the medial and lateral meniscus were removed, leaving minimal meniscus remaining.  The osteophytes off of the femur were removed using a rongeur.    The ASM navigation block was pinned on to the femur.  The cutting block was then positioned and aligned based on the computer alignment.  It was pinned in place and the distal femoral cut was made. Next the tibial guide was placed at the ASM block pinned to the tibia.  The cutting block was in position based on the computer navigation.  Cutting block pin place and the tibial cut was made.  At this point the knee was noted to be balanced with proper flexion and extension gaps.  The sizing block was then placed onto the distal femur.  It was pinned in place setting the 30 degrees of external rotation.  At this point the cutting block was noted to be a size 7 based off the sizer.  The sizer was removed.  The cutting block was placed.  The anterior cut followed by the anterior chamfer posterior cut followed by the posterior chamfer cut was made.  Trial femur was placed.  Trial tibial tray was placed.  It was a size 7 with a trial 13 millimeter poly.  The knee was noted to be balanced at this point.  The tibia was then prepped placing the tibial plate guide onto the proximal tibia.  It was then punched.  It was then punched comparing the size 7 tibial tray.  Once this was done the distal femur  holes were drilled based off of the trial femoral component.  At this point all trial components were removed.  The patella was resurfaced removing 9 millimeter of bone.  A 35 millimeter all polyethylene patella was inserted and peg holes drilled.  The knee was put through range of motion.  The patella was noted to track  appropriately with good alignment and was felt to be stable.  The wounds were irrigated out with copious amounts of normal saline.  The tibia was cemented first removing excess cement followed by the femur.  The cement was allowed to polymerize and the tibial insert was placed.  The knee was irrigated out with copious amounts of normal saline.  The knee was noted to be tracking in the correct position and alignment with good motion.     At this point two medium Hemovac drains were placed in the joint coming out through the skin.  Figure of eight #1 Vicryl interrupted sutures were used to close the arthrotomy. Subcuticular stitches of 2-0 Vicryl followed by skin staples were used to closed the skin.  A sterile occlusive dressing was placed followed by a Cryo-Cuff.  The patient was then taken from the Operative table and taken to the Recovery Room in good condition.  All counts are correct. There were no complications.        Description of Technical Procedures:     Significant Surgical Tasks Conducted by the Assistant(s), if Applicable:     Estimated Blood Loss (EBL): * No values recorded between 7/12/2022  8:38 AM and 7/12/2022 10:26 AM *           Implants:   Implant Name Type Inv. Item Serial No.  Lot No. LRB No. Used Action   CEMENT BONE SIMPLEX P SURGICAL - [id number] Cement CEMENT BONE SIMPLEX P SURGICAL  [employer] ([facility]) [id number] Right 1 Implanted   CEMENT BONE SIMPLEX P SURGICAL - [id number] Cement CEMENT BONE SIMPLEX P SURGICAL  [employer] ([facility]) [id number] Right 1 Implanted   GUIDEPIN 3.20MM 4 [employer]CCI SQUARE - [id number]  GUIDEPIN 3.20MM 4 GUCCI [facility] ([facility])  Right 1 Implanted and Explanted   GUIDEPIN 3.20MM 4 GUCCI SQUARE - [id number]  GUIDEPIN 3.20MM 4 [employer] [facility] ([facility])  Right 1 Implanted and Explanted   IMP BASEPLATE TIBIAL CEMENTED #7 - [id number] Prosthesis IMP BASEPLATE TIBIAL CEMENTED #7  [employer] ([facility])  [id number] Right 1 Implanted   IMP COMPONENT FEMORAL SZ 6 RT - [id number] Prosthesis IMP COMPONENT FEMORAL SZ 6 RT  [employer] ([facility]) [id number] Right 1 Implanted   IMP PATELLA ASYMMETRIC X3 - [id number] Prosthesis IMP PATELLA ASYMMETRIC X3  [employer] ([facility]) [id number] Right 1 Implanted   TIBIAL BEARING INSERT     [id number] Right 1 Implanted       Specimens:   Specimen (24h ago, onward)            None                  Condition: Good    Disposition: PACU - hemodynamically stable.    Attestation: I was present and scrubbed for the entire procedure.

## 2022-07-12 NOTE — PT/OT/SLP EVAL
Physical Therapy Evaluation    Patient Name:  Rex Hassan   MRN:  [id number]    Recommendations:     Discharge Recommendations:  outpatient PT, home health PT   Discharge Equipment Recommendations: 3-in-1 commode   Barriers to discharge: None    Assessment:     Rex Hassan is a 62 y.o. male admitted with a medical diagnosis of Primary osteoarthritis of both knees.  He presents with the following impairments/functional limitations:  decreased ROM, gait instability, pain Patient with good pain control and mobility. Will start rom in am. Okay for home tomorrow.    Rehab Prognosis: Good; patient would benefit from acute skilled PT services to address these deficits and reach maximum level of function.    Recent Surgery: Procedure(s) (LRB):  ARTHROPLASTY, KNEE, TOTAL, USING COMPUTER-ASSISTED NAVIGATION (Right) Day of Surgery    Plan:     During this hospitalization, patient to be seen BID to address the identified rehab impairments via gait training, therapeutic activities, therapeutic exercises and progress toward the following goals:    · Plan of Care Expires:  07/12/22    Subjective     Chief Complaint: post op pain  Patient/Family Comments/goals: Plan is for dc home with HHPT in am  Pain/Comfort:  · Pain Rating 1: 2/10  · Location - Side 1: Right  · Location 1: knee  · Pain Addressed 1: Cessation of Activity    Patients cultural, spiritual, Jewish conflicts given the current situation:      Living Environment:  Lives alone, girlfriend next door  Prior to admission, patients level of function was independent.  Equipment used at home: walker, rolling.  DME owned (not currently used): none.  Upon discharge, patient will have assistance from spouse.    Objective:     Communicated with nurse prior to session.  Patient found supine with peripheral IV, blood pressure cuff, cryotherapy  upon PT entry to room.    General Precautions: Standard, fall   Orthopedic Precautions:RLE weight bearing as tolerated   Braces:     Respiratory Status: Room air    Exams:  · 5/5 LE, wnl rom    Functional Mobility:  · Bed Mobility:     · Supine to Sit: minimum assistance  · Sit to Supine: minimum assistance  · Transfers:     · Sit to Stand:  minimum assistance with rolling walker  · Gait: ambulated 20 feet with rolling walker cga    Therapeutic Activities and Exercises:   TKR protocol 3x10    AM-PAC 6 CLICK MOBILITY  Total Score:18     Patient left supine with call button in reach.    GOALS:   Multidisciplinary Problems     Physical Therapy Goals        Problem: Physical Therapy    Goal Priority Disciplines Outcome Goal Variances Interventions   Physical Therapy Goal     PT, PT/OT Ongoing, Progressing                     History:     Past Medical History:   Diagnosis Date    Hyperlipidemia     Hypertension        Past Surgical History:   Procedure Laterality Date    HERNIA REPAIR      X2    SHOULDER SURGERY Left        Time Tracking:     PT Received On: 07/12/22  PT Start Time: 1645     PT Stop Time: 1710  PT Total Time (min): 25 min     Billable Minutes: Evaluation 20 07/12/2022

## 2022-07-12 NOTE — INTERVAL H&P NOTE
The patient has been examined and the H&P has been reviewed:    I concur with the findings and no changes have occurred since H&P was written.    Anesthesia risks, benefits and alternative options discussed and understood by patient/family.          Active Hospital Problems    Diagnosis  POA    *Primary osteoarthritis of both knees [M17.0]  Yes      Resolved Hospital Problems   No resolved problems to display.

## 2022-07-12 NOTE — SUBJECTIVE & OBJECTIVE
Past Medical History:   Diagnosis Date    Hyperlipidemia     Hypertension        Past Surgical History:   Procedure Laterality Date    HERNIA REPAIR      X2    SHOULDER SURGERY Left        Review of patient's allergies indicates:   Allergen Reactions    Ace inhibitors Anaphylaxis     Other reaction(s): SWELLING-THROAT, SWELLING-LIPS       No current facility-administered medications on file prior to encounter.     Current Outpatient Medications on File Prior to Encounter   Medication Sig    aspirin (ECOTRIN) 81 MG EC tablet Take 81 mg by mouth once daily.    cholecalciferol, vitamin D3, 100 mcg (4,000 unit) Tab Take 10 mcg by mouth.    cyanocobalamin (VITAMIN B-12) 1000 MCG tablet Take 1,000 mcg by mouth.    furosemide (LASIX) 20 MG tablet Take 20 mg by mouth.    meloxicam (MOBIC) 15 MG tablet Take 15 mg by mouth.    NIFEdipine (ADALAT CC) 90 MG TbSR Take 90 mg by mouth.    potassium chloride (MICRO-K) 10 MEQ CpSR Take 10 mEq by mouth.    rosuvastatin (CRESTOR) 20 MG tablet Take 10 mg by mouth.    sildenafiL (VIAGRA) 100 MG tablet Take 50 mg by mouth.     Family History       Problem Relation (Age of Onset)    Depression Maternal Grandmother    Hypertension Maternal Grandmother          Tobacco Use    Smoking status: Never Smoker    Smokeless tobacco: Never Used   Substance and Sexual Activity    Alcohol use: Yes     Comment: socially    Drug use: Never    Sexual activity: Yes     Review of Systems   Constitutional:  Negative for chills, fatigue and fever.   HENT:  Negative for congestion, sore throat and trouble swallowing.    Eyes:  Negative for visual disturbance.   Respiratory:  Negative for cough, chest tightness and shortness of breath.    Cardiovascular:  Negative for chest pain, palpitations and leg swelling.   Gastrointestinal:  Negative for abdominal pain, constipation, diarrhea, nausea and vomiting.   Genitourinary:  Negative for difficulty urinating, dysuria, flank pain, frequency, hematuria and urgency.    Musculoskeletal:  Positive for arthralgias.   Skin:  Negative for rash.   Neurological:  Negative for dizziness, syncope, weakness, light-headedness and headaches.   Psychiatric/Behavioral:  Negative for confusion. The patient is not nervous/anxious.    Objective:     Vital Signs (Most Recent):  Temp: 97.6 °F (36.4 °C) (07/12/22 1130)  Pulse: 69 (07/12/22 1130)  Resp: 18 (07/12/22 1351)  BP: 131/74 (07/12/22 1202)  SpO2: 95 % (07/12/22 1130) Vital Signs (24h Range):  Temp:  [97.6 °F (36.4 °C)-98.6 °F (37 °C)] 97.6 °F (36.4 °C)  Pulse:  [69-83] 69  Resp:  [10-18] 18  SpO2:  [94 %-96 %] 95 %  BP: (112-134)/(70-79) 131/74     Weight: 110.7 kg (244 lb)  Body mass index is 32.19 kg/m².    Physical Exam  Vitals reviewed.   Constitutional:       General: He is not in acute distress.     Appearance: Normal appearance.   HENT:      Head: Normocephalic and atraumatic.      Mouth/Throat:      Mouth: Mucous membranes are moist.   Eyes:      General: No scleral icterus.     Extraocular Movements: Extraocular movements intact.      Pupils: Pupils are equal, round, and reactive to light.   Cardiovascular:      Rate and Rhythm: Normal rate and regular rhythm.      Heart sounds: Normal heart sounds.   Pulmonary:      Effort: Pulmonary effort is normal. No respiratory distress.      Breath sounds: Normal breath sounds.   Abdominal:      General: Abdomen is flat. Bowel sounds are normal.      Palpations: Abdomen is soft.      Tenderness: There is no abdominal tenderness.   Musculoskeletal:      Cervical back: Normal range of motion and neck supple.      Left lower leg: No edema.      Comments: R leg dressed and wrapped postop   Skin:     General: Skin is warm and dry.      Findings: No rash.   Neurological:      General: No focal deficit present.      Mental Status: He is alert and oriented to person, place, and time.   Psychiatric:         Mood and Affect: Mood normal.         Behavior: Behavior normal.       Significant Labs: All  pertinent labs within the past 24 hours have been reviewed.  BMP:   Recent Labs   Lab 07/12/22  1115   *      K 3.9   *   CO2 28   BUN 17   CREATININE 1.11   CALCIUM 8.5     CBC:   Recent Labs   Lab 07/12/22  1115   WBC 5.12   HGB 11.7*   HCT 35.5*          Significant Imaging: I have reviewed all pertinent imaging results/findings within the past 24 hours.

## 2022-07-12 NOTE — ANESTHESIA PROCEDURE NOTES
Peripheral Block    Patient location during procedure: OR   Block not for primary anesthetic.  Reason for block: at surgeon's request and post-op pain management   Post-op Pain Location: Right      Staffing  Authorizing Provider: Michael Acosta MD  Performing Provider: Michael Acosta MD    Preanesthetic Checklist  Completed: patient identified, risks and benefits discussed, pre-op evaluation and timeout performed  Peripheral Block  Patient position: supine  Prep: ChloraPrep  Block type: adductor canal  Laterality: right  Injection technique: single shot  Needle  Needle localization: ultrasound guidance     Assessment  Injection assessment: negative aspiration    Medications:    Medications: ROPIvacaine (NAROPIN) injection 0.75% - Perineural   40 mL - 7/12/2022 8:23:00 AM    Additional Notes  No complications.

## 2022-07-12 NOTE — ANESTHESIA PROCEDURE NOTES
Intubation    Date/Time: 7/12/2022 7:55 AM  Performed by: Yumi Baig CRNA  Authorized by: Michael Acosta MD     Intubation:     Induction:  Intravenous    Intubated:  Postinduction    Mask Ventilation:  Easy with oral airway    Attempts:  1    Attempted By:  CRNA    Blade:  Nusrat 4    Laryngeal View Grade: Grade IIA - cords partially seen      Difficult Airway Encountered?: No      Complications:  None    Airway Device:  Oral endotracheal tube    Airway Device Size:  7.5    Style/Cuff Inflation:  Cuffed (inflated to minimal occlusive pressure)    Tube secured:  23    Secured at:  The lips    Placement Verified By:  Capnometry    Complicating Factors:  None    Findings Post-Intubation:  BS equal bilateral

## 2022-07-12 NOTE — PLAN OF CARE
[facility] - Orthopedic  Initial Discharge Assessment       Primary Care Provider: Primary Doctor No    Admission Diagnosis: Pain in both knees, unspecified chronicity [M25.561, M25.562]  Primary osteoarthritis of both knees [M17.0]    Admission Date: 7/12/2022  Expected Discharge Date:     Discharge Barriers Identified: None    Payor: [employer] / Plan: [facility] OPTUM / Product Type: Government /     Extended Emergency Contact Information  Primary Emergency Contact: KEO KNOX  Mobile Phone: [phone]  Relation: Son  Preferred language: English   needed? No    Discharge Plan A: Home Health ([facility])  Discharge Plan B: Home      [facility] - [place], [place] - [address]  [address] [place] [address]  Phone: [phone] Fax: [phone]     [facility] - [place], [place] - [address]  [address]  [place] [address]  Phone: [phone] Fax: [phone]    [facility], [place] - [address]  [address]  [place] [address]  Phone: [phone] Fax: [phone]      Initial Assessment (most recent)     Adult Discharge Assessment - 07/12/22 1515        Discharge Assessment    Assessment Type Discharge Planning Assessment     Source of Information patient     Lives With alone     Do you expect to return to your current living situation? Yes     Do you have help at home or someone to help you manage your care at home? No     Prior to hospitilization cognitive status: Alert/Oriented     Current cognitive status: Alert/Oriented     Home Accessibility stairs to enter home;stairs within home     Number of Stairs, Within Home, Primary none     Number of Stairs, Main Entrance none     Equipment Currently Used at Home walker, rolling;bedside commode     Patient currently being followed by outpatient case management? No     Do you currently have service(s) that help you manage your care at home?  No     Do you have prescription coverage? Yes     Coverage [facility]     Do you have any problems affording any of your prescribed medications? No     Is the patient taking medications as prescribed? yes     Who is going to help you get home at discharge? Danay Saleh- Significant Other     How do you get to doctors appointments? car, drives self     Are you on dialysis? No     Do you take coumadin? No     Discharge Plan A Home Health   [facility]    Discharge Plan B Home     DME Needed Upon Discharge  none     Discharge Plan discussed with: Patient     Discharge Barriers Identified None               Pt lives at home alone,not current with hh and [facility]. Pt plans to dc home with [facility], choice obtained. SW faxed initial hh referral to [facility]. SW will cont to follow for dc needs.

## 2022-07-12 NOTE — PT/OT/SLP EVAL
Occupational Therapy   Evaluation    Name: Rex Hassan  MRN: [id number]  Admitting Diagnosis:  Primary osteoarthritis of both knees  Recent Surgery: Procedure(s) (LRB):  ARTHROPLASTY, KNEE, TOTAL, USING COMPUTER-ASSISTED NAVIGATION (Right) Day of Surgery    Recommendations:     Discharge Recommendations: home with home health  Discharge Equipment Recommendations:   (to be determined)  Barriers to discharge:  None    Assessment:     Rex Hassan is a 62 y.o. male with a medical diagnosis of Primary osteoarthritis of both knees.  He presents with (R) TKR.pt agreed to OT evaluation. Performance deficits affecting function: impaired self care skills, impaired functional mobilty, decreased lower extremity function, pain.      Rehab Prognosis: Good; patient would benefit from acute skilled OT services to address these deficits and reach maximum level of function.       Plan:     Patient to be seen 5 x/week to address the above listed problems via self-care/home management, therapeutic activities, therapeutic exercises  · Plan of Care Expires:    · Plan of Care Reviewed with: patient    Subjective     Chief Complaint: (R) TKR  Patient/Family Comments/goals: To return home    Occupational Profile:  Living Environment: Pt lives alone in 1 story home no steps  Previous level of function: I with self care prior  Roles and Routines: I with daily activities  Equipment Used at Home:  walker, rolling  Assistance upon Discharge: neighbor/Significant other    Pain/Comfort:  · Pain Rating 1: 2/10  · Location - Side 1: Right  · Location 1: knee  · Pain Addressed 1: Reposition, Distraction  · Pain Rating Post-Intervention 1: 2/10    Patients cultural, spiritual, Protestant conflicts given the current situation: no    Objective:     Communicated with: OMEGA Molina prior to session.  Patient found HOB elevated with blood pressure cuff, knee immobilizer, peripheral IV, pulse ox (continuous), SCD, cryotherapy upon OT entry to  room.    General Precautions: Standard, fall   Orthopedic Precautions:RLE weight bearing as tolerated   Braces: Knee immobilizer  Respiratory Status: Room air    Occupational Performance:    Bed Mobility:    · Patient completed Rolling/Turning to Right with minimum assistance  · Patient completed Supine to Sit with minimum assistance  · Patient completed Sit to Supine with minimum assistance    Functional Mobility/Transfers:  · Patient completed Sit <> Stand Transfer with contact guard assistance  with  rolling walker   · Patient completed Bed <> Chair Transfer using Step Transfer technique with contact guard assistance with rolling walker  · Functional Mobility: CGA    Activities of Daily Living:  · Upper Body Dressing: minimum assistance .    Cognitive/Visual Perceptual:  Cognitive/Psychosocial Skills:     -       Oriented to: Person, Place and Situation   -       Follows Commands/attention:Follows one-step commands  -       Communication: clear/fluent  Visual/Perceptual:      -reading glasses .    Physical Exam:  Balance:    -       (S) sitting EOB  Skin integrity: Visible skin intact  Edema:  None noted  Sensation:    -       Intact  Motor Planning:    -       wfl  Dominant hand:    -       Right  Upper Extremity Range of Motion:     -       Right Upper Extremity: WFL  -       Left Upper Extremity: WFL  Upper Extremity Strength:    -       Right Upper Extremity: WFL  -       Left Upper Extremity: WFL   Strength:    -       Right Upper Extremity: WFL  -       Left Upper Extremity: WFL  Fine Motor Coordination:    -       Intact    AMPAC 6 Click ADL:  AMPAC Total Score: 19    Treatment & Education:  OT evaluation completed. See eval for details.  · Pt educated on OT role/POC.   · Importance of OOB activity with staff assistance.  · Importance of sitting up in the chair throughout the day as tolerated, especially for meals   · Safety during functional t/f and mobility with use of RW  · Importance of assisting  with self-care activities   · All questions/concerns answered within OT scope of practice    Education:    Patient left supine with all lines intact, call button in reach and nurse notified    GOALS:   Multidisciplinary Problems     Occupational Therapy Goals        Problem: Occupational Therapy    Goal Priority Disciplines Outcome Interventions   Occupational Therapy Goal     OT, PT/OT Ongoing, Progressing    Description: ST.Pt will perform bathing with min a with AD as needed  2.Pt will perform UE dressing (I)  3.Pt will perform LE dressing with Min a with AD as needed  4.Pt will transfer bed/chair/bsc with SBA  5.Pt will perform standing task x 2 min with SBA  6.Tolerate 15 min of tx without fatigue.      LTG:   Restore to max I with selfcare and mobility.                      History:     Past Medical History:   Diagnosis Date    Hyperlipidemia     Hypertension        Past Surgical History:   Procedure Laterality Date    HERNIA REPAIR      X2    SHOULDER SURGERY Left        Time Tracking:     OT Date of Treatment: 22  OT Start Time: 1640  OT Stop Time: 1655  OT Total Time (min): 15 min    Billable Minutes:Evaluation 15    2022

## 2022-07-12 NOTE — INTERVAL H&P NOTE
The patient has been examined and the H&P has been reviewed:    I concur with the findings and changes have been noted since the H&P was written:  Patient has occasional edema in the right lower extremity no history of DVT palpable pulses no skin breakdown noted no history of skin breakdown on the right lower extremity is on Lasix for fluid management    Surgery risks, benefits and alternative options discussed and understood by patient/family.          Active Hospital Problems    Diagnosis  POA    *Primary osteoarthritis of both knees [M17.0]  Yes      Resolved Hospital Problems   No resolved problems to display.

## 2022-07-12 NOTE — OR NURSING
1050 REC'D TO PACU IN STABLE COND. PT AWAKE & ALERT. VS STABLE  NO DISTRESS NOTED @ THIS TIME    XRAYS & LAB OBTAINED.    1120 TRANSFERRED TO ROOM 449 IN STABLE COND. PT AWAKE & ALERT. VS STABLE. BEDSIDE REPORT GIVEN TO NICOLE DUFF. NO DISTRESS NOTED@ THIS TIME.

## 2022-07-12 NOTE — PLAN OF CARE
Problem: Occupational Therapy  Goal: Occupational Therapy Goal  Description: ST.Pt will perform bathing with min a with AD as needed  2.Pt will perform UE dressing (I)  3.Pt will perform LE dressing with Min a with AD as needed  4.Pt will transfer bed/chair/bsc with SBA  5.Pt will perform standing task x 2 min with SBA  6.Tolerate 15 min of tx without fatigue.      LTG:   Restore to max I with selfcare and mobility.     Outcome: Ongoing, Progressing

## 2022-07-12 NOTE — HPI
63yo M with PMH HTN, HLD, osteoarthritis seen in consult postoperatively.     Patient underwent total R knee arthroplasty with Dr. Joe. Patient is resting in bed at the time of my exam. His pain is well controlled with medication. Rand catheter removed and has since voided. Denies chest pain, SOB, nausea. Will follow for management of patient's chronic conditions. Plans to discharge home following hospitalization.

## 2022-07-12 NOTE — ASSESSMENT & PLAN NOTE
- s/p total R knee arthroplasty with Dr. Joe, further management per primary team  - Pain control: norco 5mg, morphine 4mg IV PRN   - Abx: vancomycin, ancef x1 dose

## 2022-07-12 NOTE — CONSULTS
[facility]  [facility]  Consult Note    Patient Name: Rex Hassan  MRN: [id number]  Admission Date: 7/12/2022  Hospital Length of Stay: 0 days  Attending Physician: Jeanine Lei,*   Primary Care Provider: Primary Doctor No           Patient information was obtained from patient, past medical records and ER records.     Inpatient consult to Hospitalist  Consult performed by: Jeanine Lei MD  Consult ordered by: Rex Joe MD        Subjective:     Principal Problem: Primary osteoarthritis of both knees    Chief Complaint: No chief complaint on file.       HPI: 63yo M with PMH HTN, HLD, osteoarthritis seen in consult postoperatively.     Patient underwent total R knee arthroplasty with Dr. Joe. Patient is resting in bed at the time of my exam. His pain is well controlled with medication. Rand catheter removed and has since voided. Denies chest pain, SOB, nausea. Will follow for management of patient's chronic conditions. Plans to discharge home following hospitalization.      Past Medical History:   Diagnosis Date    Hyperlipidemia     Hypertension        Past Surgical History:   Procedure Laterality Date    HERNIA REPAIR      X2    SHOULDER SURGERY Left        Review of patient's allergies indicates:   Allergen Reactions    Ace inhibitors Anaphylaxis     Other reaction(s): SWELLING-THROAT, SWELLING-LIPS       No current facility-administered medications on file prior to encounter.     Current Outpatient Medications on File Prior to Encounter   Medication Sig    aspirin (ECOTRIN) 81 MG EC tablet Take 81 mg by mouth once daily.    cholecalciferol, vitamin D3, 100 mcg (4,000 unit) Tab Take 10 mcg by mouth.    cyanocobalamin (VITAMIN B-12) 1000 MCG tablet Take 1,000 mcg by mouth.    furosemide (LASIX) 20 MG tablet Take 20 mg by mouth.    meloxicam (MOBIC) 15 MG tablet Take 15 mg by mouth.    NIFEdipine (ADALAT CC) 90 MG TbSR Take 90 mg by mouth.     potassium chloride (MICRO-K) 10 MEQ CpSR Take 10 mEq by mouth.    rosuvastatin (CRESTOR) 20 MG tablet Take 10 mg by mouth.    sildenafiL (VIAGRA) 100 MG tablet Take 50 mg by mouth.     Family History       Problem Relation (Age of Onset)    Depression Maternal Grandmother    Hypertension Maternal Grandmother          Tobacco Use    Smoking status: Never Smoker    Smokeless tobacco: Never Used   Substance and Sexual Activity    Alcohol use: Yes     Comment: socially    Drug use: Never    Sexual activity: Yes     Review of Systems   Constitutional:  Negative for chills, fatigue and fever.   HENT:  Negative for congestion, sore throat and trouble swallowing.    Eyes:  Negative for visual disturbance.   Respiratory:  Negative for cough, chest tightness and shortness of breath.    Cardiovascular:  Negative for chest pain, palpitations and leg swelling.   Gastrointestinal:  Negative for abdominal pain, constipation, diarrhea, nausea and vomiting.   Genitourinary:  Negative for difficulty urinating, dysuria, flank pain, frequency, hematuria and urgency.   Musculoskeletal:  Positive for arthralgias.   Skin:  Negative for rash.   Neurological:  Negative for dizziness, syncope, weakness, light-headedness and headaches.   Psychiatric/Behavioral:  Negative for confusion. The patient is not nervous/anxious.    Objective:     Vital Signs (Most Recent):  Temp: 97.6 °F (36.4 °C) (07/12/22 1130)  Pulse: 69 (07/12/22 1130)  Resp: 18 (07/12/22 1351)  BP: 131/74 (07/12/22 1202)  SpO2: 95 % (07/12/22 1130) Vital Signs (24h Range):  Temp:  [97.6 °F (36.4 °C)-98.6 °F (37 °C)] 97.6 °F (36.4 °C)  Pulse:  [69-83] 69  Resp:  [10-18] 18  SpO2:  [94 %-96 %] 95 %  BP: (112-134)/(70-79) 131/74     Weight: 110.7 kg (244 lb)  Body mass index is 32.19 kg/m².    Physical Exam  Vitals reviewed.   Constitutional:       General: He is not in acute distress.     Appearance: Normal appearance.   HENT:      Head: Normocephalic and atraumatic.       Mouth/Throat:      Mouth: Mucous membranes are moist.   Eyes:      General: No scleral icterus.     Extraocular Movements: Extraocular movements intact.      Pupils: Pupils are equal, round, and reactive to light.   Cardiovascular:      Rate and Rhythm: Normal rate and regular rhythm.      Heart sounds: Normal heart sounds.   Pulmonary:      Effort: Pulmonary effort is normal. No respiratory distress.      Breath sounds: Normal breath sounds.   Abdominal:      General: Abdomen is flat. Bowel sounds are normal.      Palpations: Abdomen is soft.      Tenderness: There is no abdominal tenderness.   Musculoskeletal:      Cervical back: Normal range of motion and neck supple.      Left lower leg: No edema.      Comments: R leg dressed and wrapped postop   Skin:     General: Skin is warm and dry.      Findings: No rash.   Neurological:      General: No focal deficit present.      Mental Status: He is alert and oriented to person, place, and time.   Psychiatric:         Mood and Affect: Mood normal.         Behavior: Behavior normal.       Significant Labs: All pertinent labs within the past 24 hours have been reviewed.  BMP:   Recent Labs   Lab 07/12/22  1115   *      K 3.9   *   CO2 28   BUN 17   CREATININE 1.11   CALCIUM 8.5     CBC:   Recent Labs   Lab 07/12/22  1115   WBC 5.12   HGB 11.7*   HCT 35.5*          Significant Imaging: I have reviewed all pertinent imaging results/findings within the past 24 hours.    Assessment/Plan:     * Primary osteoarthritis of both knees  - s/p total R knee arthroplasty with Dr. Joe, further management per primary team  - Pain control: norco 5mg, morphine 4mg IV PRN   - Abx: vancomycin, ancef x1 dose     Dyslipidemia  - On rosuvastatin 20mg outpatient, will give atorvastatin 40mg while inpatient     Essential hypertension  - Continue patient's home nifedipine 30mg, lasix 20mg       VTE Risk Mitigation (From admission, onward)         Ordered     apixaban  tablet 2.5 mg  2 times daily        Question:  Is the patient competent?  Answer:  Yes    07/12/22 1151     IP VTE HIGH RISK PATIENT  Once         07/12/22 1151     Place MELANIE hose  Until discontinued         07/12/22 1151              Thank you for your consult. I will follow-up with patient. Please contact us if you have any additional questions.    Lay Shearer MD  Department of Hospital Medicine   [facility]

## 2022-07-13 VITALS
DIASTOLIC BLOOD PRESSURE: 80 MMHG | BODY MASS INDEX: 32.34 KG/M2 | RESPIRATION RATE: 17 BRPM | HEIGHT: 73 IN | OXYGEN SATURATION: 98 % | HEART RATE: 59 BPM | TEMPERATURE: 98 F | SYSTOLIC BLOOD PRESSURE: 122 MMHG | WEIGHT: 244 LBS

## 2022-07-13 LAB
ALBUMIN SERPL BCP-MCNC: 3.4 G/DL (ref 3.5–5)
ALBUMIN/GLOB SERPL: 1.1 {RATIO}
ALP SERPL-CCNC: 32 U/L (ref 45–115)
ALT SERPL W P-5'-P-CCNC: 30 U/L (ref 16–61)
ANION GAP SERPL CALCULATED.3IONS-SCNC: 12 MMOL/L (ref 7–16)
AST SERPL W P-5'-P-CCNC: 20 U/L (ref 15–37)
BASOPHILS # BLD AUTO: 0.01 K/UL (ref 0–0.2)
BASOPHILS NFR BLD AUTO: 0.2 % (ref 0–1)
BILIRUB SERPL-MCNC: 0.4 MG/DL (ref 0–1.2)
BUN SERPL-MCNC: 14 MG/DL (ref 7–18)
BUN/CREAT SERPL: 15 (ref 6–20)
CALCIUM SERPL-MCNC: 8.3 MG/DL (ref 8.5–10.1)
CHLORIDE SERPL-SCNC: 106 MMOL/L (ref 98–107)
CO2 SERPL-SCNC: 28 MMOL/L (ref 21–32)
CREAT SERPL-MCNC: 0.95 MG/DL (ref 0.7–1.3)
DIFFERENTIAL METHOD BLD: ABNORMAL
EOSINOPHIL # BLD AUTO: 0.02 K/UL (ref 0–0.5)
EOSINOPHIL NFR BLD AUTO: 0.3 % (ref 1–4)
ERYTHROCYTE [DISTWIDTH] IN BLOOD BY AUTOMATED COUNT: 14.6 % (ref 11.5–14.5)
GLOBULIN SER-MCNC: 3.1 G/DL (ref 2–4)
GLUCOSE SERPL-MCNC: 103 MG/DL (ref 74–106)
HCT VFR BLD AUTO: 33.5 % (ref 40–54)
HGB BLD-MCNC: 10.8 G/DL (ref 13.5–18)
IMM GRANULOCYTES # BLD AUTO: 0.01 K/UL (ref 0–0.04)
IMM GRANULOCYTES NFR BLD: 0.2 % (ref 0–0.4)
LYMPHOCYTES # BLD AUTO: 1.56 K/UL (ref 1–4.8)
LYMPHOCYTES NFR BLD AUTO: 27 % (ref 27–41)
MCH RBC QN AUTO: 28.3 PG (ref 27–31)
MCHC RBC AUTO-ENTMCNC: 32.2 G/DL (ref 32–36)
MCV RBC AUTO: 87.7 FL (ref 80–96)
MONOCYTES # BLD AUTO: 0.66 K/UL (ref 0–0.8)
MONOCYTES NFR BLD AUTO: 11.4 % (ref 2–6)
MPC BLD CALC-MCNC: ABNORMAL G/DL
NEUTROPHILS # BLD AUTO: 3.51 K/UL (ref 1.8–7.7)
NEUTROPHILS NFR BLD AUTO: 60.9 % (ref 53–65)
NRBC # BLD AUTO: 0 X10E3/UL
NRBC, AUTO (.00): 0 %
PLATELET # BLD AUTO: 112 K/UL (ref 150–400)
POTASSIUM SERPL-SCNC: 3.9 MMOL/L (ref 3.5–5.1)
PROT SERPL-MCNC: 6.5 G/DL (ref 6.4–8.2)
RBC # BLD AUTO: 3.82 M/UL (ref 4.6–6.2)
SODIUM SERPL-SCNC: 142 MMOL/L (ref 136–145)
WBC # BLD AUTO: 5.77 K/UL (ref 4.5–11)

## 2022-07-13 PROCEDURE — 25000003 PHARM REV CODE 250: Performed by: FAMILY MEDICINE

## 2022-07-13 PROCEDURE — 97110 THERAPEUTIC EXERCISES: CPT | Mod: CQ

## 2022-07-13 PROCEDURE — 63600175 PHARM REV CODE 636 W HCPCS: Performed by: ORTHOPAEDIC SURGERY

## 2022-07-13 PROCEDURE — 80053 COMPREHEN METABOLIC PANEL: CPT | Performed by: ORTHOPAEDIC SURGERY

## 2022-07-13 PROCEDURE — 36415 COLL VENOUS BLD VENIPUNCTURE: CPT | Performed by: ORTHOPAEDIC SURGERY

## 2022-07-13 PROCEDURE — 97116 GAIT TRAINING THERAPY: CPT | Mod: CQ

## 2022-07-13 PROCEDURE — 99225 PR SUBSEQUENT OBSERVATION CARE,LEVEL II: CPT | Mod: GC,,, | Performed by: INTERNAL MEDICINE

## 2022-07-13 PROCEDURE — 99225 PR SUBSEQUENT OBSERVATION CARE,LEVEL II: ICD-10-PCS | Mod: GC,,, | Performed by: INTERNAL MEDICINE

## 2022-07-13 PROCEDURE — 97535 SELF CARE MNGMENT TRAINING: CPT

## 2022-07-13 PROCEDURE — 25000003 PHARM REV CODE 250: Performed by: ORTHOPAEDIC SURGERY

## 2022-07-13 PROCEDURE — 85025 COMPLETE CBC W/AUTO DIFF WBC: CPT | Performed by: ORTHOPAEDIC SURGERY

## 2022-07-13 RX ORDER — HYDROCODONE BITARTRATE AND ACETAMINOPHEN 10; 325 MG/1; MG/1
1 TABLET ORAL EVERY 4 HOURS PRN
Qty: 30 TABLET | Refills: 0 | Status: SHIPPED | OUTPATIENT
Start: 2022-07-13 | End: 2022-07-27

## 2022-07-13 RX ADMIN — ASPIRIN 81 MG: 81 TABLET, COATED ORAL at 09:07

## 2022-07-13 RX ADMIN — NIFEDIPINE 30 MG: 30 TABLET, FILM COATED, EXTENDED RELEASE ORAL at 09:07

## 2022-07-13 RX ADMIN — FUROSEMIDE 20 MG: 20 TABLET ORAL at 09:07

## 2022-07-13 RX ADMIN — HYDROCODONE BITARTRATE AND ACETAMINOPHEN 1 TABLET: 5; 325 TABLET ORAL at 06:07

## 2022-07-13 RX ADMIN — POTASSIUM CHLORIDE 20 MEQ: 1500 TABLET, EXTENDED RELEASE ORAL at 09:07

## 2022-07-13 RX ADMIN — ATORVASTATIN CALCIUM 40 MG: 40 TABLET, FILM COATED ORAL at 09:07

## 2022-07-13 RX ADMIN — CEFAZOLIN SODIUM 2 G: 10 INJECTION, POWDER, FOR SOLUTION INTRAVENOUS at 12:07

## 2022-07-13 RX ADMIN — DOCUSATE SODIUM 100 MG: 100 CAPSULE, LIQUID FILLED ORAL at 09:07

## 2022-07-13 RX ADMIN — SODIUM CHLORIDE 75 ML/HR: 9 INJECTION, SOLUTION INTRAVENOUS at 06:07

## 2022-07-13 RX ADMIN — APIXABAN 2.5 MG: 2.5 TABLET, FILM COATED ORAL at 09:07

## 2022-07-13 NOTE — PLAN OF CARE
Problem: Adult Inpatient Plan of Care  Goal: Plan of Care Review  7/13/2022 0018 by Geraldine Yang CRTT  Outcome: Ongoing, Progressing  7/12/2022 2037 by Geraldine Yang CRTT  Outcome: Ongoing, Progressing  Goal: Patient-Specific Goal (Individualized)  7/13/2022 0018 by Geraldine Yang CRTT  Outcome: Ongoing, Progressing  7/12/2022 2037 by Geraldine Yang CRTT  Outcome: Ongoing, Progressing  Goal: Absence of Hospital-Acquired Illness or Injury  7/13/2022 0018 by Geraldine Yang CRTT  Outcome: Ongoing, Progressing  7/12/2022 2037 by Geraldine Yang CRTT  Outcome: Ongoing, Progressing  Goal: Optimal Comfort and Wellbeing  7/13/2022 0018 by Geraldine Yang CRTT  Outcome: Ongoing, Progressing  7/12/2022 2037 by Geraldine Yang CRTT  Outcome: Ongoing, Progressing  Goal: Readiness for Transition of Care  7/13/2022 0018 by Geraldine Yang CRTT  Outcome: Ongoing, Progressing  7/12/2022 2037 by Geraldine Yang CRTT  Outcome: Ongoing, Progressing     Problem: Infection  Goal: Absence of Infection Signs and Symptoms  7/13/2022 0018 by Geraldine Yang CRTT  Outcome: Ongoing, Progressing  7/12/2022 2037 by Geraldine Yang CRTT  Outcome: Ongoing, Progressing     Problem: Fall Injury Risk  Goal: Absence of Fall and Fall-Related Injury  7/13/2022 0018 by Geraldine Yang CRTT  Outcome: Ongoing, Progressing  7/12/2022 2037 by Geraldine Yang CRTT  Outcome: Ongoing, Progressing

## 2022-07-13 NOTE — PLAN OF CARE
Problem: Adult Inpatient Plan of Care  Goal: Plan of Care Review  7/13/2022 1053 by Kamlesh Patterson RN  Outcome: Met  7/13/2022 0807 by Kamlesh Patterson RN  Outcome: Ongoing, Progressing  Goal: Patient-Specific Goal (Individualized)  7/13/2022 1053 by Kamlesh Patterson RN  Outcome: Met  7/13/2022 0807 by Kamlesh Patterson RN  Outcome: Ongoing, Progressing  Goal: Absence of Hospital-Acquired Illness or Injury  7/13/2022 1053 by Kamlesh Patterson RN  Outcome: Met  7/13/2022 0807 by Kamlesh Patterson RN  Outcome: Ongoing, Progressing  Goal: Optimal Comfort and Wellbeing  7/13/2022 1053 by Kamlesh Patterson RN  Outcome: Met  7/13/2022 0807 by Kamlesh Patterson RN  Outcome: Ongoing, Progressing  Goal: Readiness for Transition of Care  7/13/2022 1053 by Kamlesh Patterson RN  Outcome: Met  7/13/2022 0807 by Kamlesh Patterson RN  Outcome: Ongoing, Progressing     Problem: Infection  Goal: Absence of Infection Signs and Symptoms  7/13/2022 1053 by Kamlesh Patterson RN  Outcome: Met  7/13/2022 0807 by Kamlesh Patterson RN  Outcome: Ongoing, Progressing     Problem: Fall Injury Risk  Goal: Absence of Fall and Fall-Related Injury  7/13/2022 1053 by Kamlesh Patterson RN  Outcome: Met  7/13/2022 0807 by Kamlesh Patterson RN  Outcome: Ongoing, Progressing

## 2022-07-13 NOTE — SUBJECTIVE & OBJECTIVE
Interval History: Patient seen today prior to discharge. Pain well controlled. Patient will have home health and physical therapy on discharge.     Review of Systems   Constitutional:  Negative for chills, fatigue and fever.   HENT:  Negative for sore throat and trouble swallowing.    Respiratory:  Negative for cough and shortness of breath.    Cardiovascular:  Negative for chest pain, palpitations and leg swelling.   Gastrointestinal:  Negative for abdominal pain, constipation, diarrhea, nausea and vomiting.   Genitourinary:  Negative for difficulty urinating, dysuria, flank pain, frequency, hematuria and urgency.   Musculoskeletal:  Positive for arthralgias.   Skin:  Negative for rash.   Neurological:  Negative for syncope, light-headedness and headaches.   Psychiatric/Behavioral:  Negative for confusion. The patient is not nervous/anxious.    Objective:     Vital Signs (Most Recent):  Temp: 98.1 °F (36.7 °C) (07/13/22 1022)  Pulse: (!) 59 (07/13/22 1022)  Resp: 17 (07/13/22 1022)  BP: 122/80 (07/13/22 1022)  SpO2: 98 % (07/13/22 1022)   Vital Signs (24h Range):  Temp:  [97.4 °F (36.3 °C)-98.1 °F (36.7 °C)] 98.1 °F (36.7 °C)  Pulse:  [59-70] 59  Resp:  [16-18] 17  SpO2:  [93 %-99 %] 98 %  BP: (121-140)/(72-80) 122/80     Weight: 110.7 kg (244 lb)  Body mass index is 32.19 kg/m².    Intake/Output Summary (Last 24 hours) at 7/13/2022 1220  Last data filed at 7/13/2022 0515  Gross per 24 hour   Intake 350 ml   Output 3470 ml   Net -3120 ml      Physical Exam  Vitals reviewed.   Constitutional:       General: He is not in acute distress.     Appearance: Normal appearance.   HENT:      Head: Normocephalic and atraumatic.      Mouth/Throat:      Mouth: Mucous membranes are moist.   Eyes:      General: No scleral icterus.     Extraocular Movements: Extraocular movements intact.      Pupils: Pupils are equal, round, and reactive to light.   Cardiovascular:      Rate and Rhythm: Normal rate and regular rhythm.      Heart  sounds: Normal heart sounds.   Pulmonary:      Effort: Pulmonary effort is normal. No respiratory distress.      Breath sounds: Normal breath sounds.   Abdominal:      General: Abdomen is flat. Bowel sounds are normal.      Palpations: Abdomen is soft.      Tenderness: There is no abdominal tenderness.   Musculoskeletal:      Cervical back: Normal range of motion and neck supple.      Left lower leg: No edema.      Comments: R leg dressed and wrapped postop   Skin:     General: Skin is warm and dry.      Findings: No rash.   Neurological:      General: No focal deficit present.      Mental Status: He is alert and oriented to person, place, and time.   Psychiatric:         Mood and Affect: Mood normal.         Behavior: Behavior normal.       Significant Labs: All pertinent labs within the past 24 hours have been reviewed.  BMP:   Recent Labs   Lab 07/13/22  0625         K 3.9      CO2 28   BUN 14   CREATININE 0.95   CALCIUM 8.3*     CBC:   Recent Labs   Lab 07/12/22  1115 07/13/22  0625   WBC 5.12 5.77   HGB 11.7* 10.8*   HCT 35.5* 33.5*    112*       Significant Imaging: I have reviewed all pertinent imaging results/findings within the past 24 hours.

## 2022-07-13 NOTE — PT/OT/SLP PROGRESS
Occupational Therapy   Treatment    Name: Rex Hassan  MRN: [id number]  Admitting Diagnosis:  Primary osteoarthritis of both knees  1 Day Post-Op    Recommendations:     Discharge Recommendations: home with home health  Discharge Equipment Recommendations:   (recommended a reacher)  Barriers to discharge:  None    Assessment:     Rex Hassan is a 62 y.o. male with a medical diagnosis of Primary osteoarthritis of both knees.  He presents with complaint of (R) knee pain. Pt agreed to OT treatment. Performance deficits affecting function are impaired endurance, impaired self care skills, impaired functional mobilty, impaired balance, pain.     Rehab Prognosis:  Good; patient would benefit from acute skilled OT services to address these deficits and reach maximum level of function.       Plan:     Patient to be seen 5 x/week to address the above listed problems via self-care/home management, therapeutic activities, therapeutic exercises  · Plan of Care Expires:    · Plan of Care Reviewed with: patient, significant other    Subjective     Pain/Comfort:  · Pain Rating 1: 2/10  · Location - Side 1: Right  · Location 1: knee  · Pain Addressed 1: Reposition, Distraction  · Pain Rating Post-Intervention 1: 2/10    Objective:     Communicated with: OMEGA Patterson  prior to session.  Patient found up in chair with cryotherapy, peripheral IV upon OT entry to room.    General Precautions: Standard, fall   Orthopedic Precautions:RLE weight bearing as tolerated   Braces: N/A  Respiratory Status: Room air     Occupational Performance:     Bed Mobility:    ·      Functional Mobility/Transfers:  · Patient completed Sit <> Stand Transfer with contact guard assistance and increased effort  with  gait belt to stand to RW   · Functional Mobility: NT    Activities of Daily Living:  · Grooming: Pt reports (I) with grooming. .  · Bathing: I with UE anterior bathing. Pt crossed large towel over shoulders and bathe back (I). Pt stood with CGA  for perineum/buttock bathing performed with self set .(I) with bathing (L) LE and (D) with bathing exposed toes on (R)  · Upper Body Dressing: independence donning pullover shirt.  · Lower Body Dressing: pt donned pants and underwear over feet (I). Pt educated to dion affected (R) LE first. Pt stood with CGA to dion over hips. Pt donned socks (I) with increased effort .      [facility] 6 Click ADL:      Treatment & Education:  Pt participated well with tx. Recommended a reacher for home to increase safety when retrieving items out of reach.    Patient left up in chair with all lines intact, call button in reach, nurse notified and significant other presentEducation:      GOALS:   Multidisciplinary Problems     Occupational Therapy Goals        Problem: Occupational Therapy    Goal Priority Disciplines Outcome Interventions   Occupational Therapy Goal     OT, PT/OT Ongoing, Progressing    Description: ST.Pt will perform bathing with min a with AD as needed  2.Pt will perform UE dressing (I)  3.Pt will perform LE dressing with Min a with AD as needed  4.Pt will transfer bed/chair/bsc with SBA  5.Pt will perform standing task x 2 min with SBA  6.Tolerate 15 min of tx without fatigue.      LTG:   Restore to max I with selfcare and mobility.                      Time Tracking:     OT Date of Treatment: 22  OT Start Time: 936  OT Stop Time:   OT Total Time (min): 37 min    Billable Minutes:Self Care/Home Management 34    OT/CHALO: OT          2022

## 2022-07-13 NOTE — NURSING
Discharge instructions reviewed with patient and spouse; and copy given to patient. Patient and spouse voiced understanding regarding:meds, appt., signs and symptoms to report to physician. As well as the following:*Keep dressing dry and intact, do not remove dressing, if dressing becomes wet or bloody notify home health staff.  Home Health will remove your drain (if you have one) on post op day #2 and change your dressing on post op day #3, give them that special dressing we sent home with you.  *Continue incentive spirometry at least every 2 hours while awake.  *Continue white stockings remove 2 times a day for 1 hour and replace. Once dressing is changed, apply other stocking to surgery leg.   *Continue cool-jet to knee. Do not apply directly to skin.   *Take laxative of choice to have a bowel movement at least by tomorrow and then every other day.  *Increase fluids by mouth.  *Staples will be removed by home health/swingbed staff 2 weeks from surgery prior to follow up appoinment.  *Elevate surgery leg on pillow at ankle. No pillow under knees.  *Notify home health staff of any concerns.

## 2022-07-13 NOTE — PT/OT/SLP PROGRESS
Physical Therapy Treatment    Patient Name:  Rex Hassan   MRN:  [id number]    Recommendations:     Discharge Recommendations:  home health PT, outpatient PT   Discharge Equipment Recommendations: 3-in-1 commode   Barriers to discharge: None    Assessment:     Rex Hassan is a 62 y.o. male admitted with a medical diagnosis of Primary osteoarthritis of both knees.  He presents with the following impairments/functional limitations:  Pain, decreased ROM  And weakness. Pt. With good motivation/ participation with PT tx. Demonstrates good understanding of safety precautions, HEP.    Rehab Prognosis: Good; patient would benefit from acute skilled PT services to address these deficits and reach maximum level of function.    Recent Surgery: Procedure(s) (LRB):  ARTHROPLASTY, KNEE, TOTAL, USING COMPUTER-ASSISTED NAVIGATION (Right) 1 Day Post-Op    Plan:     During this hospitalization, patient to be seen BID to address the identified rehab impairments via gait training, therapeutic activities, therapeutic exercises and progress toward the following goals:    · Plan of Care Expires:  08/12/22    Subjective     Chief Complaint: Knee pain  Patient/Family Comments/goals: Pt. States Left knee is sore today too. [facility].   Pain/Comfort:  · Pain Rating 1: 3/10  · Location 1: knee  · Pain Addressed 1: Reposition, Distraction, Other (see comments) (movement)      Objective:     Communicated with Nursing and Supervising  PT to discuss POC and treatment goals prior to session.  Patient found HOB elevated with peripheral IV, cryotherapy, blood pressure cuff upon PT entry to room.     General Precautions: Standard, fall   Orthopedic Precautions:RLE weight bearing as tolerated   Braces: Knee immobilizer  Respiratory Status: Room air     Functional Mobility:  · Bed Mobility:     · Rolling Right: contact guard assistance  · Scooting: minimum assistance  · Supine to Sit:  contact guard assistance and minimum assistance  · Transfers:     · Sit to Stand:  minimum assistance with rolling walker  · Bed to Chair: minimum assistance with  rolling walker  using  Step Transfer  · Gait: Pt. participated in gait with RW x 120 feet with VC for safe distance to walker, appropriate step length and sequencing.       AM-PAC 6 CLICK MOBILITY  Turning over in bed (including adjusting bedclothes, sheets and blankets)?: 3  Sitting down on and standing up from a chair with arms (e.g., wheelchair, bedside commode, etc.): 3  Moving to and from a bed to a chair (including a wheelchair)?: 3  Need to walk in hospital room?: 3  Climbing 3-5 steps with a railing?: 3       Therapeutic Activities and Exercises:   Pt. Participated in Supine: AP, QS and SAQs 3 x 10 reps each and Heel slides x 15 reps with AP @ end range x 12 reps. Instructed pt. Spouse in appropriate frequency and amount of assist needed to complete exercises @ home.     Patient left up in chair with call button in reach, spouse present and feet elevated..    GOALS:   Multidisciplinary Problems     Physical Therapy Goals        Problem: Physical Therapy    Goal Priority Disciplines Outcome Goal Variances Interventions   Physical Therapy Goal     PT, PT/OT Ongoing, Progressing                     Time Tracking:     PT Received On: 07/13/22  PT Start Time: 0840     PT Stop Time: 0915  PT Total Time (min): 35 min     Billable Minutes: Gait Training 14 and Therapeutic Exercise 20    Treatment Type: Treatment  PT/PTA: PTA     PTA Visit Number: 1 07/13/2022

## 2022-07-13 NOTE — NURSING
Pt is awake and alert with wife at bedside.  He denies any pain at this time.  Completed skin assessment when vitals and assessment were done.  Pt will be d/c home today.

## 2022-07-13 NOTE — PLAN OF CARE
Problem: Adult Inpatient Plan of Care  Goal: Plan of Care Review  Outcome: Ongoing, Progressing  Flowsheets (Taken 7/13/2022 0316)  Plan of Care Reviewed With:   patient   spouse  Goal: Patient-Specific Goal (Individualized)  Outcome: Ongoing, Progressing  Flowsheets (Taken 7/13/2022 0316)  Anxieties, Fears or Concerns: pain control  Individualized Care Needs: pain control, ambulation  Patient-Specific Goals (Include Timeframe): pain control  Goal: Absence of Hospital-Acquired Illness or Injury  Outcome: Ongoing, Progressing  Intervention: Identify and Manage Fall Risk  Flowsheets (Taken 7/13/2022 0316)  Safety Promotion/Fall Prevention: assistive device/personal item within reach  Intervention: Prevent Skin Injury  Flowsheets (Taken 7/13/2022 0316)  Body Position: turned  Skin Protection: incontinence pads utilized  Intervention: Prevent and Manage VTE (Venous Thromboembolism) Risk  Flowsheets (Taken 7/13/2022 0316)  Activity Management:   Arm raise - L1   Rolling - L1  VTE Prevention/Management: remove, assess skin, and reapply sequential compression device  Range of Motion: active ROM (range of motion) encouraged  Intervention: Prevent Infection  Flowsheets (Taken 7/13/2022 0316)  Infection Prevention:   hand hygiene promoted   personal protective equipment utilized   rest/sleep promoted   single patient room provided  Goal: Optimal Comfort and Wellbeing  Outcome: Ongoing, Progressing  Intervention: Monitor Pain and Promote Comfort  Flowsheets (Taken 7/13/2022 0316)  Pain Management Interventions:   care clustered   pain management plan reviewed with patient/caregiver   quiet environment facilitated   relaxation techniques promoted  Intervention: Provide Person-Centered Care  Flowsheets (Taken 7/13/2022 0316)  Trust Relationship/Rapport:   care explained   choices provided   emotional support provided  Goal: Readiness for Transition of Care  Outcome: Ongoing, Progressing   Plan of care reviewed, patient  progressing.

## 2022-07-13 NOTE — PROGRESS NOTES
[facility] Medicine  Progress Note    Patient Name: Rex Hassan  MRN: [id number]  Patient Class: OP- Outpatient Recovery   Admission Date: 7/12/2022  Length of Stay: 0 days  Attending Physician: No att. providers found  Primary Care Provider: Primary Doctor No        Subjective:     Principal Problem:Primary osteoarthritis of both knees    HPI:  63yo M with PMH HTN, HLD, osteoarthritis seen in consult postoperatively.     Patient underwent total R knee arthroplasty with Dr. Joe. Patient is resting in bed at the time of my exam. His pain is well controlled with medication. Rand catheter removed and has since voided. Denies chest pain, SOB, nausea. Will follow for management of patient's chronic conditions. Plans to discharge home following hospitalization.    Overview/Hospital Course:  No notes on file    Interval History: Patient seen today prior to discharge. Pain well controlled. Patient will have home health and physical therapy on discharge.     Review of Systems   Constitutional:  Negative for chills, fatigue and fever.   HENT:  Negative for sore throat and trouble swallowing.    Respiratory:  Negative for cough and shortness of breath.    Cardiovascular:  Negative for chest pain, palpitations and leg swelling.   Gastrointestinal:  Negative for abdominal pain, constipation, diarrhea, nausea and vomiting.   Genitourinary:  Negative for difficulty urinating, dysuria, flank pain, frequency, hematuria and urgency.   Musculoskeletal:  Positive for arthralgias.   Skin:  Negative for rash.   Neurological:  Negative for syncope, light-headedness and headaches.   Psychiatric/Behavioral:  Negative for confusion. The patient is not nervous/anxious.    Objective:     Vital Signs (Most Recent):  Temp: 98.1 °F (36.7 °C) (07/13/22 1022)  Pulse: (!) 59 (07/13/22 1022)  Resp: 17 (07/13/22 1022)  BP: 122/80 (07/13/22 1022)  SpO2: 98 % (07/13/22 1022)   Vital Signs (24h Range):  Temp:  [97.4 °F  (36.3 °C)-98.1 °F (36.7 °C)] 98.1 °F (36.7 °C)  Pulse:  [59-70] 59  Resp:  [16-18] 17  SpO2:  [93 %-99 %] 98 %  BP: (121-140)/(72-80) 122/80     Weight: 110.7 kg (244 lb)  Body mass index is 32.19 kg/m².    Intake/Output Summary (Last 24 hours) at 7/13/2022 1220  Last data filed at 7/13/2022 0515  Gross per 24 hour   Intake 350 ml   Output 3470 ml   Net -3120 ml      Physical Exam  Vitals reviewed.   Constitutional:       General: He is not in acute distress.     Appearance: Normal appearance.   HENT:      Head: Normocephalic and atraumatic.      Mouth/Throat:      Mouth: Mucous membranes are moist.   Eyes:      General: No scleral icterus.     Extraocular Movements: Extraocular movements intact.      Pupils: Pupils are equal, round, and reactive to light.   Cardiovascular:      Rate and Rhythm: Normal rate and regular rhythm.      Heart sounds: Normal heart sounds.   Pulmonary:      Effort: Pulmonary effort is normal. No respiratory distress.      Breath sounds: Normal breath sounds.   Abdominal:      General: Abdomen is flat. Bowel sounds are normal.      Palpations: Abdomen is soft.      Tenderness: There is no abdominal tenderness.   Musculoskeletal:      Cervical back: Normal range of motion and neck supple.      Left lower leg: No edema.      Comments: R leg dressed and wrapped postop   Skin:     General: Skin is warm and dry.      Findings: No rash.   Neurological:      General: No focal deficit present.      Mental Status: He is alert and oriented to person, place, and time.   Psychiatric:         Mood and Affect: Mood normal.         Behavior: Behavior normal.       Significant Labs: All pertinent labs within the past 24 hours have been reviewed.  BMP:   Recent Labs   Lab 07/13/22  0625         K 3.9      CO2 28   BUN 14   CREATININE 0.95   CALCIUM 8.3*     CBC:   Recent Labs   Lab 07/12/22  1115 07/13/22  0625   WBC 5.12 5.77   HGB 11.7* 10.8*   HCT 35.5* 33.5*    112*        Significant Imaging: I have reviewed all pertinent imaging results/findings within the past 24 hours.      Assessment/Plan:      * Primary osteoarthritis of both knees  - s/p total R knee arthroplasty with Dr. Joe, further management per primary team  - Home with home health and physical therapy    Dyslipidemia  - On rosuvastatin 20mg outpatient, will give atorvastatin 40mg while inpatient     Essential hypertension  - Continue patient's home nifedipine 30mg, lasix 20mg       VTE Risk Mitigation (From admission, onward)         Ordered     apixaban tablet 2.5 mg  2 times daily        Question:  Is the patient competent?  Answer:  Yes    07/12/22 1151     IP VTE HIGH RISK PATIENT  Once         07/13/22 0724     Place MELANIE hose  Until discontinued         07/13/22 0724     Place MELANIE hose  Until discontinued         07/12/22 1151                Discharge Planning   OTILIO: 7/13/2022     Code Status: Prior   Is the patient medically ready for discharge?:     Reason for patient still in hospital (select all that apply): Pending disposition  Discharge Plan A: Home Health ([facility])   Discharge Delays: None known at this time      Lay Shearer MD  Department of Hospital Medicine   [facility] [place] - Orthopedic

## 2022-07-13 NOTE — PLAN OF CARE
[facility] - Orthopedic  Discharge Final Note    Primary Care Provider: Primary Doctor No    Expected Discharge Date: 7/13/2022    Final Discharge Note (most recent)     Final Note - 07/13/22 1038        Final Note    Assessment Type Final Discharge Note     Anticipated Discharge Disposition Home-Health Care [facility]    What phone number can be called within the next 1-3 days to see how you are doing after discharge? [phone]        Post-Acute Status    Post-Acute Authorization Home Health     Home Health Status Set-up Complete/Auth obtained     Patient choice form signed by patient/caregiver List with quality metrics by geographic area provided;List from CMS Compare     Discharge Delays None known at this time                 Important Message from Medicare              Follow-up providers     Rex Joe MD   Specialty: Orthopedic Surgery    [address]   [address]  Rex Joe Md, Orthopedic & Sports Medicine, [facility] [address]   Phone: [phone]       Next Steps: Follow up in 3 week(s)    Instructions: For wound re-check please follow up on Aug. 3 at 9:10          After-discharge care            Home Medical Care     [facility]   Service: Home Health Services    [address] [place] [address] [place] [address]   Phone: [phone]                       Pt to [facility] today. SW faxed dc orders and summary to [facility]. [facility] No other needs.

## 2022-07-13 NOTE — ASSESSMENT & PLAN NOTE
- s/p total R knee arthroplasty with Dr. Joe, further management per primary team  - Home with home health and physical therapy

## 2022-07-13 NOTE — DISCHARGE SUMMARY
[facility] - Orthopedic  Orthopedics  Discharge Summary      Patient Name: Markell Hassan  MRN: [id number]  Admission Date: 7/12/2022  Hospital Length of Stay: 0 days  Discharge Date and Time:  07/13/2022 7:21 AM  Attending Physician: Jeanine Lei,*   Discharging Provider: Markell Joe MD  Primary Care Provider: Primary Doctor No    HPI:   62-year-old male with severe degenerative joint disease right knee who has failed non operative treatment including injections pain medication he has been walking with a cane for over 5 years his pain is worse weight-bearing interferes with activities in daily living is risk for falls  Right lower extremity moves his toes has sensation to touch has palpable pulses tender to palpation over his medial joint line pain and crepitus on motion no instability the knee is noted he has range of motion 5 to a little past 100°  X-rays show tricompartmental degenerative changes right knee  Impression severe degenerative joint disease right knee  Plan total knee arthroplasty on the right with or without patellar resurfacing      Procedure(s) (LRB):  ARTHROPLASTY, KNEE, TOTAL, USING COMPUTER-ASSISTED NAVIGATION (Right)      Hospital Course:  No notes on file patient admitted to the hospital on 07/12/2022 underwent a right total knee arthroplasty without incident.  On postop day 1 his drains DC. He moves his toes dorsiflexion plantar flexion.  Sensation intact to touch in the right lower extremity.  Palpable dorsalis pedis pulse.  Patient tolerating p.o. pain meds.  DC home.  Home health PT.  DC staples 2 weeks.  Follow with Dr. Joe in 3-4 weeks.  Eliquis for DVT prophylaxis.    Goals of Care Treatment Preferences:  Code Status: Full Code      Consults (From admission, onward)        Status Ordering Provider     Inpatient consult to Hospitalist  Once        Provider:  (Not yet assigned)    Completed MARKELL JOE     IP consult case management/social work  Once         Provider:  (Not yet assigned)    MARKELL Pettit Diagnostic Studies: Labs: All labs within the past 24 hours have been reviewed    Pending Diagnostic Studies:     None        Final Active Diagnoses:    Diagnosis Date Noted POA    PRINCIPAL PROBLEM:  Primary osteoarthritis of both knees [M17.0] 06/08/2022 Yes    Essential hypertension [I10] 06/21/2022 Yes    Dyslipidemia [E78.5] 06/21/2022 Yes      Problems Resolved During this Admission:      Discharged Condition: good    Disposition: Home-Health Care [facility]    Follow Up:   Follow-up Information     Markell Joe MD Follow up in 3 week(s).    Specialty: Orthopedic Surgery  Why: For wound re-check  Contact information:  [address]  [address]  Markell Joe Md, Orthopedic & Sports Medicine, [facility] [address]  [phone]                       Patient Instructions:   No discharge procedures on file.  Medications:  Reconciled Home Medications:      Medication List      ASK your doctor about these medications    aspirin 81 MG EC tablet  Commonly known as: ECOTRIN  Take 81 mg by mouth once daily.     cholecalciferol (vitamin D3) 100 mcg (4,000 unit) Tab  Take 10 mcg by mouth.     cyanocobalamin 1000 MCG tablet  Commonly known as: VITAMIN B-12  Take 1,000 mcg by mouth.     ferrous sulfate 325 mg (65 mg iron) Tab tablet  Commonly known as: FEOSOL  Take 325 mg by mouth daily with breakfast.     furosemide 20 MG tablet  Commonly known as: LASIX  Take 20 mg by mouth.     meloxicam 15 MG tablet  Commonly known as: MOBIC  Take 15 mg by mouth.     NIFEdipine 90 MG Tbsr  Commonly known as: ADALAT CC  Take 90 mg by mouth.     potassium chloride 10 MEQ Cpsr  Commonly known as: MICRO-K  Take 10 mEq by mouth.     rosuvastatin 20 MG tablet  Commonly known as: CRESTOR  Take 10 mg by mouth.     sildenafiL 100 MG tablet  Commonly known as: VIAGRA  Take 50 mg by mouth.            Markell Joe MD  Orthopedics  [facility] - Orthopedic

## 2022-07-14 ENCOUNTER — TELEPHONE (OUTPATIENT)
Dept: ORTHOPEDICS | Facility: HOSPITAL | Age: 62
End: 2022-07-14
Payer: OTHER GOVERNMENT

## 2022-07-14 NOTE — TELEPHONE ENCOUNTER
Is your pain tolerable? yes      Has Home health therapy/outpatient therapy come to see you? yes      Are you taking your ecotrin/lovenox/eliquis? eliquis yes      Have you had a bowel movement since surgery?yes yesterday       Are you wearing your MELANIE hose? yes      Are you doing ankle pumps?yes      Are you doing your incentive spirometry?yes      Any complaints/concerns? none

## 2022-07-15 ENCOUNTER — HOSPITAL ENCOUNTER (OUTPATIENT)
Dept: RADIOLOGY | Facility: HOSPITAL | Age: 62
Discharge: HOME | End: 2022-07-15
Attending: ORTHOPAEDIC SURGERY
Payer: OTHER GOVERNMENT

## 2022-07-15 DIAGNOSIS — R22.43 LOCALIZED SWELLING, MASS, OR LUMP OF LOWER EXTREMITY, BILATERAL: ICD-10-CM

## 2022-07-15 PROCEDURE — 93970 EXTREMITY STUDY: CPT | Mod: 26,,, | Performed by: RADIOLOGY

## 2022-07-15 PROCEDURE — 93970 US LOWER EXTREMITY VEINS BILATERAL: ICD-10-PCS | Mod: 26,,, | Performed by: RADIOLOGY

## 2022-07-15 PROCEDURE — 93970 EXTREMITY STUDY: CPT | Mod: TC

## 2022-07-16 ENCOUNTER — TELEPHONE (OUTPATIENT)
Dept: ORTHOPEDICS | Facility: HOSPITAL | Age: 62
End: 2022-07-16
Payer: OTHER GOVERNMENT

## 2022-07-16 NOTE — TELEPHONE ENCOUNTER
Is your pain tolerable? yes      Has Home health therapy/outpatient therapy come to see you? yes      Are you taking your ecotrin/lovenox/eliquis? Eliquis; yes      Have you had a bowel movement since surgery? yes      Are you wearing your MELANIE hose? yes      Are you doing ankle pumps?yes      Are you doing your incentive spirometry?yes      Any complaints/concerns?

## 2022-07-27 ENCOUNTER — HOSPITAL ENCOUNTER (OUTPATIENT)
Dept: RADIOLOGY | Facility: HOSPITAL | Age: 62
Discharge: HOME | End: 2022-07-27
Attending: ORTHOPAEDIC SURGERY
Payer: OTHER GOVERNMENT

## 2022-07-27 DIAGNOSIS — Z96.651 STATUS POST TOTAL KNEE REPLACEMENT, RIGHT: ICD-10-CM

## 2022-07-27 PROCEDURE — 73560 X-RAY EXAM OF KNEE 1 OR 2: CPT | Mod: TC,RT

## 2022-09-12 ENCOUNTER — HOSPITAL ENCOUNTER (OUTPATIENT)
Dept: RADIOLOGY | Facility: HOSPITAL | Age: 62
Discharge: HOME | End: 2022-09-12
Attending: ORTHOPAEDIC SURGERY
Payer: OTHER GOVERNMENT

## 2022-09-12 DIAGNOSIS — Z96.651 STATUS POST TOTAL RIGHT KNEE REPLACEMENT: ICD-10-CM

## 2022-09-12 PROCEDURE — 73560 X-RAY EXAM OF KNEE 1 OR 2: CPT | Mod: TC,RT

## 2023-03-10 DIAGNOSIS — Z96.651 STATUS POST TOTAL RIGHT KNEE REPLACEMENT: Primary | ICD-10-CM

## 2023-03-13 ENCOUNTER — OFFICE VISIT (OUTPATIENT)
Dept: ORTHOPEDICS | Facility: CLINIC | Age: 63
End: 2023-03-13
Payer: OTHER GOVERNMENT

## 2023-03-13 ENCOUNTER — HOSPITAL ENCOUNTER (OUTPATIENT)
Dept: RADIOLOGY | Facility: HOSPITAL | Age: 63
Discharge: HOME | End: 2023-03-13
Attending: ORTHOPAEDIC SURGERY
Payer: OTHER GOVERNMENT

## 2023-03-13 DIAGNOSIS — Z96.651 STATUS POST TOTAL RIGHT KNEE REPLACEMENT: ICD-10-CM

## 2023-03-13 DIAGNOSIS — M25.562 ACUTE PAIN OF LEFT KNEE: ICD-10-CM

## 2023-03-13 DIAGNOSIS — M25.562 ACUTE PAIN OF LEFT KNEE: Primary | ICD-10-CM

## 2023-03-13 DIAGNOSIS — Z01.812 PRE-OPERATIVE LABORATORY EXAMINATION: ICD-10-CM

## 2023-03-13 DIAGNOSIS — Z96.652 STATUS POST TOTAL KNEE REPLACEMENT, LEFT: ICD-10-CM

## 2023-03-13 DIAGNOSIS — Z01.811 PRE-OPERATIVE RESPIRATORY EXAMINATION: ICD-10-CM

## 2023-03-13 PROCEDURE — 73560 X-RAY EXAM OF KNEE 1 OR 2: CPT | Mod: TC,RT

## 2023-03-13 PROCEDURE — 73564 X-RAY EXAM KNEE 4 OR MORE: CPT | Mod: TC,LT

## 2023-03-13 PROCEDURE — 99213 OFFICE O/P EST LOW 20 MIN: CPT | Mod: PBBFAC | Performed by: ORTHOPAEDIC SURGERY

## 2023-03-13 PROCEDURE — 99214 OFFICE O/P EST MOD 30 MIN: CPT | Mod: S$PBB,,, | Performed by: ORTHOPAEDIC SURGERY

## 2023-03-13 PROCEDURE — 73564 X-RAY EXAM KNEE 4 OR MORE: CPT | Mod: 26,LT,, | Performed by: ORTHOPAEDIC SURGERY

## 2023-03-13 PROCEDURE — 73560 X-RAY EXAM OF KNEE 1 OR 2: CPT | Mod: 26,RT,, | Performed by: ORTHOPAEDIC SURGERY

## 2023-03-13 PROCEDURE — 99214 PR OFFICE/OUTPT VISIT, EST, LEVL IV, 30-39 MIN: ICD-10-PCS | Mod: S$PBB,,, | Performed by: ORTHOPAEDIC SURGERY

## 2023-03-13 PROCEDURE — 73560 XR KNEE 1 OR 2 VIEW RIGHT: ICD-10-PCS | Mod: 26,RT,, | Performed by: ORTHOPAEDIC SURGERY

## 2023-03-13 PROCEDURE — 73564 XR KNEE COMP 4 OR MORE VIEWS LEFT: ICD-10-PCS | Mod: 26,LT,, | Performed by: ORTHOPAEDIC SURGERY

## 2023-03-13 RX ORDER — METOPROLOL SUCCINATE 50 MG/1
25 TABLET, EXTENDED RELEASE ORAL
COMMUNITY
Start: 2023-02-25

## 2023-03-13 RX ORDER — LOSARTAN POTASSIUM 25 MG/1
1 TABLET ORAL NIGHTLY
COMMUNITY
Start: 2023-02-25

## 2023-03-13 RX ORDER — ROSUVASTATIN CALCIUM 10 MG/1
TABLET, COATED ORAL
COMMUNITY

## 2023-03-13 RX ORDER — POTASSIUM CHLORIDE 750 MG/1
10 TABLET, EXTENDED RELEASE ORAL
COMMUNITY
Start: 2023-02-07

## 2023-03-13 RX ORDER — SILDENAFIL 100 MG/1
TABLET, FILM COATED ORAL
COMMUNITY

## 2023-03-13 RX ORDER — CHOLECALCIFEROL (VITAMIN D3) 10(400)/ML
DROPS ORAL
COMMUNITY

## 2023-03-13 RX ORDER — SPIRONOLACTONE 25 MG/1
1 TABLET ORAL NIGHTLY
COMMUNITY
Start: 2023-02-25

## 2023-03-13 RX ORDER — BUMETANIDE 1 MG/1
1 TABLET ORAL
COMMUNITY
Start: 2023-02-25

## 2023-03-13 RX ORDER — FINASTERIDE 5 MG/1
5 TABLET, FILM COATED ORAL
COMMUNITY
Start: 2023-02-22

## 2023-03-13 NOTE — PROGRESS NOTES
Patient is here for his left knee he has a total knee arthroplasty in place on the right is doing well on the left he lacks 5° extension he has a large effusion he flexes up little bit past 100°.  There is no instability on varus valgus stress.  He has varus alignment.  He is bone-on-bone medial compartment.  He has been using a cane for over year.  This time we discussed treatment options.  We will set him up for total knee arthroplasty on left.  He is doing well with his right total knee arthroplasty.  Recently placed on Eliquis.  Will be able to stop this prior to surgery.

## 2023-03-13 NOTE — PROGRESS NOTES
Radiology Interpretation        Patient Name: Rex Hassan  Date: 3/13/2023  YOB: 1960  MRN# [id number]        ORDERING DIAGNOSIS:    Encounter Diagnosis   Name Primary?    Acute pain of left knee Yes        Four views left knee skeletally mature individual severe degenerative changes tricompartmental bone-on-bone medial compartment with varus alignment there is some subluxation noted.  Impression severe degenerative changes left knee               Rex Joe MD

## 2023-03-21 ENCOUNTER — HOSPITAL ENCOUNTER (OUTPATIENT)
Dept: RADIOLOGY | Facility: HOSPITAL | Age: 63
Discharge: HOME | End: 2023-03-21
Attending: ORTHOPAEDIC SURGERY
Payer: OTHER GOVERNMENT

## 2023-03-21 DIAGNOSIS — Z01.811 PRE-OPERATIVE RESPIRATORY EXAMINATION: ICD-10-CM

## 2023-03-21 PROCEDURE — 71046 X-RAY EXAM CHEST 2 VIEWS: CPT | Mod: TC

## 2023-03-21 PROCEDURE — 71046 X-RAY EXAM CHEST 2 VIEWS: CPT | Mod: 26,,, | Performed by: RADIOLOGY

## 2023-03-21 PROCEDURE — 71046 XR CHEST PA AND LATERAL: ICD-10-PCS | Mod: 26,,, | Performed by: RADIOLOGY

## 2023-03-31 ENCOUNTER — TELEPHONE (OUTPATIENT)
Dept: ORTHOPEDICS | Facility: CLINIC | Age: 63
End: 2023-03-31
Payer: OTHER GOVERNMENT

## 2023-03-31 NOTE — TELEPHONE ENCOUNTER
Notified patient that [facility] and stated he was a low risk for surgery and he would fax his clinic notes and Echo to our office.

## 2023-03-31 NOTE — TELEPHONE ENCOUNTER
----- Message from Jenn Rodríguez sent at 3/30/2023  3:22 PM CDT -----  Regarding: call back  Pt is needing to speak with someone to see if they received a call from [facility]. Call back [phone]

## 2023-04-05 NOTE — PLAN OF CARE
SW spoke with pt via telephone. Pt has rw and bsc. No hh. Dcp is to receive outpatient therapy at [facility] in [place], [place]. SS following for dc needs.

## 2023-04-05 NOTE — H&P (VIEW-ONLY)
Department of Orthopedic Surgery    History and Physical       Principal Problem: Acute pain of left knee [M25.562]           HISTORY:  62-year-old male with severe degenerative joint disease left knee needing total knee arthroplasty on left he is failed non operative treatment including injections pain medications walks with antalgic gait now risk for falls    PAST MEDICAL HISTORY:   Past Medical History:   Diagnosis Date    Hyperlipidemia     Hypertension         PAST SURGICAL HISTORY:   Past Surgical History:   Procedure Laterality Date    HERNIA REPAIR      X2    SHOULDER SURGERY Left           ALLERGIES:   Review of patient's allergies indicates:   Allergen Reactions    Ace inhibitors Anaphylaxis     Other reaction(s): SWELLING-THROAT, SWELLING-LIPS          MEDICATIONS: (Not in a hospital admission)       SOCIAL HISTORY:   Social History     Socioeconomic History    Marital status: Single   Tobacco Use    Smoking status: Never    Smokeless tobacco: Never   Substance and Sexual Activity    Alcohol use: Yes     Comment: socially    Drug use: Never    Sexual activity: Yes          FAMILY HISTORY:   Family History   Problem Relation Age of Onset    Hypertension Maternal Grandmother     Depression Maternal Grandmother           PHYSICAL EXAM:   There were no vitals filed for this visit.  There is no height or weight on file to calculate BMI.     In general, this is a well-developed, well-nourished male . The patient is alert, oriented and cooperative.      HEENT:  Normocephalic, atraumatic.  Extraocular movements are intact bilaterally.  The oropharynx is benign.       NECK:  Nontender with good range of motion.      LUNGS:  Clear to auscultation bilaterally.      HEART:  Demonstrates a regular rate and rhythm.  No murmurs appreciated.      ABDOMEN:  Soft, non-tender, non-distended.        EXTREMITIES:  Left lower extremity moves his toes he has sensation to touch has palpable pulses has range of motion 5-100  degrees of flexion crepitus on motion 1+ effusion no instability on varus valgus stress tender over the mediolateral joint lines.      RADIOGRAPHIC FINDINGS:  X-rays show severe degenerative joint disease left knee tricompartmental      IMPRESSION:  Severe degenerative joint disease left knee      PLAN:  Total knee arthroplasty on left with or without patellar resurfacing    I had a long discussion with the patient about treatment options, including operative and nonoperative treatments. We discussed pros and cons of each including risks pertinent to surgery including pain, infection, bleeding, damage to adjacent structures like nerves and blood vessels, failure to heal, need for future surgeries, stiffness, instability, loss of limb, anesthesia risks like stroke, blood clot, loss of life. We discussed the possibility of need for later hardware removal in the case that hardware was used. We discussed common and uncommon risks, and discussed patient specific factors that may increase the risks present with surgery. All questions were answered. The patient expressed understanding of the pros and cons of surgery and wanted to proceed with surgical treatment.        (Subject to voice recognition error, transcription service not allowed)

## 2023-04-06 ENCOUNTER — ANESTHESIA EVENT (OUTPATIENT)
Dept: SURGERY | Facility: HOSPITAL | Age: 63
End: 2023-04-06
Payer: OTHER GOVERNMENT

## 2023-04-06 ENCOUNTER — ANESTHESIA (OUTPATIENT)
Dept: SURGERY | Facility: HOSPITAL | Age: 63
End: 2023-04-06
Payer: OTHER GOVERNMENT

## 2023-04-06 ENCOUNTER — HOSPITAL ENCOUNTER (OUTPATIENT)
Facility: HOSPITAL | Age: 63
Discharge: HOME HEALTH | End: 2023-04-08
Attending: ORTHOPAEDIC SURGERY | Admitting: ORTHOPAEDIC SURGERY
Payer: OTHER GOVERNMENT

## 2023-04-06 DIAGNOSIS — Z96.652 STATUS POST TOTAL KNEE REPLACEMENT, LEFT: ICD-10-CM

## 2023-04-06 DIAGNOSIS — M17.0 PRIMARY OSTEOARTHRITIS OF BOTH KNEES: ICD-10-CM

## 2023-04-06 LAB
ANION GAP SERPL CALCULATED.3IONS-SCNC: 15 MMOL/L (ref 7–16)
BASOPHILS # BLD AUTO: 0 K/UL (ref 0–0.2)
BASOPHILS NFR BLD AUTO: 0 % (ref 0–1)
BUN SERPL-MCNC: 21 MG/DL (ref 7–18)
BUN/CREAT SERPL: 18 (ref 6–20)
CALCIUM SERPL-MCNC: 8.8 MG/DL (ref 8.5–10.1)
CHLORIDE SERPL-SCNC: 104 MMOL/L (ref 98–107)
CO2 SERPL-SCNC: 26 MMOL/L (ref 21–32)
CREAT SERPL-MCNC: 1.18 MG/DL (ref 0.7–1.3)
DIFFERENTIAL METHOD BLD: ABNORMAL
EGFR (NO RACE VARIABLE) (RUSH/TITUS): 69 ML/MIN/1.73M²
EOSINOPHIL # BLD AUTO: 0.01 K/UL (ref 0–0.5)
EOSINOPHIL NFR BLD AUTO: 0.2 % (ref 1–4)
ERYTHROCYTE [DISTWIDTH] IN BLOOD BY AUTOMATED COUNT: 14.5 % (ref 11.5–14.5)
GLUCOSE SERPL-MCNC: 118 MG/DL (ref 70–105)
GLUCOSE SERPL-MCNC: 118 MG/DL (ref 74–106)
GLUCOSE SERPL-MCNC: 126 MG/DL (ref 70–105)
HCT VFR BLD AUTO: 38.9 % (ref 40–54)
HGB BLD-MCNC: 12.5 G/DL (ref 13.5–18)
IMM GRANULOCYTES # BLD AUTO: 0.02 K/UL (ref 0–0.04)
IMM GRANULOCYTES NFR BLD: 0.4 % (ref 0–0.4)
INDIRECT COOMBS: NORMAL
LYMPHOCYTES # BLD AUTO: 0.91 K/UL (ref 1–4.8)
LYMPHOCYTES NFR BLD AUTO: 17.1 % (ref 27–41)
MCH RBC QN AUTO: 28.7 PG (ref 27–31)
MCHC RBC AUTO-ENTMCNC: 32.1 G/DL (ref 32–36)
MCV RBC AUTO: 89.2 FL (ref 80–96)
MONOCYTES # BLD AUTO: 0.11 K/UL (ref 0–0.8)
MONOCYTES NFR BLD AUTO: 2.1 % (ref 2–6)
MPC BLD CALC-MCNC: ABNORMAL G/DL
NEUTROPHILS # BLD AUTO: 4.27 K/UL (ref 1.8–7.7)
NEUTROPHILS NFR BLD AUTO: 80.2 % (ref 53–65)
NRBC # BLD AUTO: 0 X10E3/UL
NRBC, AUTO (.00): 0 %
PLATELET # BLD AUTO: 133 K/UL (ref 150–400)
POTASSIUM SERPL-SCNC: 4.4 MMOL/L (ref 3.5–5.1)
RBC # BLD AUTO: 4.36 M/UL (ref 4.6–6.2)
RH BLD: NORMAL
SODIUM SERPL-SCNC: 141 MMOL/L (ref 136–145)
SPECIMEN OUTDATE: NORMAL
WBC # BLD AUTO: 5.32 K/UL (ref 4.5–11)

## 2023-04-06 PROCEDURE — 82962 GLUCOSE BLOOD TEST: CPT

## 2023-04-06 PROCEDURE — 0055T PR COMPUTER-ASSIST MUSCSKEL NAVIG, ORTHO PROC, CT/MRI: ICD-10-PCS | Mod: ,,, | Performed by: ORTHOPAEDIC SURGERY

## 2023-04-06 PROCEDURE — 37000009 HC ANESTHESIA EA ADD 15 MINS: Performed by: ORTHOPAEDIC SURGERY

## 2023-04-06 PROCEDURE — 36000713 HC OR TIME LEV V EA ADD 15 MIN: Performed by: ORTHOPAEDIC SURGERY

## 2023-04-06 PROCEDURE — 27447 TOTAL KNEE ARTHROPLASTY: CPT | Mod: LT,,, | Performed by: ORTHOPAEDIC SURGERY

## 2023-04-06 PROCEDURE — D9220A PRA ANESTHESIA: Mod: CRNA,,, | Performed by: NURSE ANESTHETIST, CERTIFIED REGISTERED

## 2023-04-06 PROCEDURE — 64447 NJX AA&/STRD FEMORAL NRV IMG: CPT | Mod: 59,LT,, | Performed by: ANESTHESIOLOGY

## 2023-04-06 PROCEDURE — 01402 ANES OPN/ARTH TOT KNE ARTHRP: CPT | Performed by: ORTHOPAEDIC SURGERY

## 2023-04-06 PROCEDURE — 27447 PR TOTAL KNEE ARTHROPLASTY: ICD-10-PCS | Mod: LT,,, | Performed by: ORTHOPAEDIC SURGERY

## 2023-04-06 PROCEDURE — 25000003 PHARM REV CODE 250: Performed by: ORTHOPAEDIC SURGERY

## 2023-04-06 PROCEDURE — 86900 BLOOD TYPING SEROLOGIC ABO: CPT | Performed by: ORTHOPAEDIC SURGERY

## 2023-04-06 PROCEDURE — 63600175 PHARM REV CODE 636 W HCPCS: Performed by: ANESTHESIOLOGY

## 2023-04-06 PROCEDURE — 63600175 PHARM REV CODE 636 W HCPCS: Performed by: ORTHOPAEDIC SURGERY

## 2023-04-06 PROCEDURE — 25000003 PHARM REV CODE 250: Performed by: ANESTHESIOLOGY

## 2023-04-06 PROCEDURE — 25000003 PHARM REV CODE 250: Performed by: NURSE ANESTHETIST, CERTIFIED REGISTERED

## 2023-04-06 PROCEDURE — 36000712 HC OR TIME LEV V 1ST 15 MIN: Performed by: ORTHOPAEDIC SURGERY

## 2023-04-06 PROCEDURE — C1776 JOINT DEVICE (IMPLANTABLE): HCPCS | Performed by: ORTHOPAEDIC SURGERY

## 2023-04-06 PROCEDURE — D9220A PRA ANESTHESIA: Mod: ANES,,, | Performed by: ANESTHESIOLOGY

## 2023-04-06 PROCEDURE — C1769 GUIDE WIRE: HCPCS | Performed by: ORTHOPAEDIC SURGERY

## 2023-04-06 PROCEDURE — 80048 BASIC METABOLIC PNL TOTAL CA: CPT | Performed by: ORTHOPAEDIC SURGERY

## 2023-04-06 PROCEDURE — 27201423 OPTIME MED/SURG SUP & DEVICES STERILE SUPPLY: Performed by: ORTHOPAEDIC SURGERY

## 2023-04-06 PROCEDURE — 63600175 PHARM REV CODE 636 W HCPCS: Performed by: NURSE ANESTHETIST, CERTIFIED REGISTERED

## 2023-04-06 PROCEDURE — 0055T BONE SRGRY CMPTR CT/MRI IMAG: CPT | Mod: ,,, | Performed by: ORTHOPAEDIC SURGERY

## 2023-04-06 PROCEDURE — D9220A PRA ANESTHESIA: ICD-10-PCS | Mod: ANES,,, | Performed by: ANESTHESIOLOGY

## 2023-04-06 PROCEDURE — 85025 COMPLETE CBC W/AUTO DIFF WBC: CPT | Performed by: ORTHOPAEDIC SURGERY

## 2023-04-06 PROCEDURE — D9220A PRA ANESTHESIA: ICD-10-PCS | Mod: CRNA,,, | Performed by: NURSE ANESTHETIST, CERTIFIED REGISTERED

## 2023-04-06 PROCEDURE — 71000033 HC RECOVERY, INTIAL HOUR: Performed by: ORTHOPAEDIC SURGERY

## 2023-04-06 PROCEDURE — C1713 ANCHOR/SCREW BN/BN,TIS/BN: HCPCS | Performed by: ORTHOPAEDIC SURGERY

## 2023-04-06 PROCEDURE — 37000008 HC ANESTHESIA 1ST 15 MINUTES: Performed by: ORTHOPAEDIC SURGERY

## 2023-04-06 PROCEDURE — 64447 PERIPHERAL BLOCK: ICD-10-PCS | Mod: 59,LT,, | Performed by: ANESTHESIOLOGY

## 2023-04-06 PROCEDURE — 99900035 HC TECH TIME PER 15 MIN (STAT)

## 2023-04-06 DEVICE — COMPONENT CR FEM CEMENTED 6 L: Type: IMPLANTABLE DEVICE | Site: KNEE | Status: FUNCTIONAL

## 2023-04-06 DEVICE — CEMENT BONE SURG SMPLX P RADPQ: Type: IMPLANTABLE DEVICE | Site: KNEE | Status: FUNCTIONAL

## 2023-04-06 DEVICE — IMPLANTABLE DEVICE: Type: IMPLANTABLE DEVICE | Site: KNEE | Status: FUNCTIONAL

## 2023-04-06 DEVICE — BASEPLATE TIB CEM PRIM SZ 7: Type: IMPLANTABLE DEVICE | Site: KNEE | Status: FUNCTIONAL

## 2023-04-06 RX ORDER — LIDOCAINE HYDROCHLORIDE 10 MG/ML
1 INJECTION, SOLUTION EPIDURAL; INFILTRATION; INTRACAUDAL; PERINEURAL ONCE
Status: DISCONTINUED | OUTPATIENT
Start: 2023-04-06 | End: 2023-04-06 | Stop reason: HOSPADM

## 2023-04-06 RX ORDER — SODIUM CHLORIDE 9 MG/ML
INJECTION, SOLUTION INTRAVENOUS CONTINUOUS
Status: DISCONTINUED | OUTPATIENT
Start: 2023-04-06 | End: 2023-04-06

## 2023-04-06 RX ORDER — ONDANSETRON 2 MG/ML
4 INJECTION INTRAMUSCULAR; INTRAVENOUS EVERY 8 HOURS PRN
Status: DISCONTINUED | OUTPATIENT
Start: 2023-04-06 | End: 2023-04-08 | Stop reason: HOSPADM

## 2023-04-06 RX ORDER — HYDROMORPHONE HYDROCHLORIDE 2 MG/ML
0.5 INJECTION, SOLUTION INTRAMUSCULAR; INTRAVENOUS; SUBCUTANEOUS EVERY 5 MIN PRN
Status: DISCONTINUED | OUTPATIENT
Start: 2023-04-06 | End: 2023-04-06 | Stop reason: HOSPADM

## 2023-04-06 RX ORDER — TRANEXAMIC ACID 100 MG/ML
INJECTION, SOLUTION INTRAVENOUS
Status: DISCONTINUED | OUTPATIENT
Start: 2023-04-06 | End: 2023-04-06

## 2023-04-06 RX ORDER — GLYCOPYRROLATE 0.2 MG/ML
INJECTION INTRAMUSCULAR; INTRAVENOUS
Status: DISCONTINUED | OUTPATIENT
Start: 2023-04-06 | End: 2023-04-06

## 2023-04-06 RX ORDER — EPINEPHRINE 1 MG/ML
INJECTION, SOLUTION, CONCENTRATE INTRAVENOUS
Status: DISCONTINUED | OUTPATIENT
Start: 2023-04-06 | End: 2023-04-06

## 2023-04-06 RX ORDER — DOCUSATE SODIUM 100 MG/1
100 CAPSULE, LIQUID FILLED ORAL EVERY 12 HOURS
Status: DISCONTINUED | OUTPATIENT
Start: 2023-04-06 | End: 2023-04-08 | Stop reason: HOSPADM

## 2023-04-06 RX ORDER — SODIUM CHLORIDE, SODIUM LACTATE, POTASSIUM CHLORIDE, CALCIUM CHLORIDE 600; 310; 30; 20 MG/100ML; MG/100ML; MG/100ML; MG/100ML
125 INJECTION, SOLUTION INTRAVENOUS CONTINUOUS
Status: DISCONTINUED | OUTPATIENT
Start: 2023-04-06 | End: 2023-04-07

## 2023-04-06 RX ORDER — DIPHENHYDRAMINE HYDROCHLORIDE 50 MG/ML
25 INJECTION INTRAMUSCULAR; INTRAVENOUS EVERY 6 HOURS PRN
Status: DISCONTINUED | OUTPATIENT
Start: 2023-04-06 | End: 2023-04-06 | Stop reason: HOSPADM

## 2023-04-06 RX ORDER — LIDOCAINE HYDROCHLORIDE 20 MG/ML
INJECTION, SOLUTION EPIDURAL; INFILTRATION; INTRACAUDAL; PERINEURAL
Status: DISCONTINUED | OUTPATIENT
Start: 2023-04-06 | End: 2023-04-06

## 2023-04-06 RX ORDER — HYDROCODONE BITARTRATE AND ACETAMINOPHEN 5; 325 MG/1; MG/1
1 TABLET ORAL EVERY 4 HOURS PRN
Status: DISCONTINUED | OUTPATIENT
Start: 2023-04-06 | End: 2023-04-08 | Stop reason: HOSPADM

## 2023-04-06 RX ORDER — BUMETANIDE 1 MG/1
1 TABLET ORAL DAILY
Status: DISCONTINUED | OUTPATIENT
Start: 2023-04-06 | End: 2023-04-08 | Stop reason: HOSPADM

## 2023-04-06 RX ORDER — MIDAZOLAM HYDROCHLORIDE 5 MG/ML
INJECTION INTRAMUSCULAR; INTRAVENOUS
Status: DISCONTINUED | OUTPATIENT
Start: 2023-04-06 | End: 2023-04-06

## 2023-04-06 RX ORDER — OXYCODONE HYDROCHLORIDE 5 MG/1
5 TABLET ORAL
Status: DISCONTINUED | OUTPATIENT
Start: 2023-04-06 | End: 2023-04-06 | Stop reason: HOSPADM

## 2023-04-06 RX ORDER — FENTANYL CITRATE 50 UG/ML
INJECTION, SOLUTION INTRAMUSCULAR; INTRAVENOUS
Status: DISCONTINUED | OUTPATIENT
Start: 2023-04-06 | End: 2023-04-06

## 2023-04-06 RX ORDER — METOPROLOL SUCCINATE 25 MG/1
25 TABLET, EXTENDED RELEASE ORAL DAILY
Status: DISCONTINUED | OUTPATIENT
Start: 2023-04-06 | End: 2023-04-08 | Stop reason: HOSPADM

## 2023-04-06 RX ORDER — CEFAZOLIN SODIUM 1 G/3ML
INJECTION, POWDER, FOR SOLUTION INTRAMUSCULAR; INTRAVENOUS
Status: DISCONTINUED | OUTPATIENT
Start: 2023-04-06 | End: 2023-04-06

## 2023-04-06 RX ORDER — MORPHINE SULFATE 10 MG/ML
4 INJECTION INTRAMUSCULAR; INTRAVENOUS; SUBCUTANEOUS EVERY 5 MIN PRN
Status: DISCONTINUED | OUTPATIENT
Start: 2023-04-06 | End: 2023-04-06 | Stop reason: HOSPADM

## 2023-04-06 RX ORDER — ASPIRIN 81 MG/1
81 TABLET ORAL DAILY
Status: DISCONTINUED | OUTPATIENT
Start: 2023-04-06 | End: 2023-04-08 | Stop reason: HOSPADM

## 2023-04-06 RX ORDER — MORPHINE SULFATE 4 MG/ML
4 INJECTION, SOLUTION INTRAMUSCULAR; INTRAVENOUS EVERY 4 HOURS PRN
Status: DISCONTINUED | OUTPATIENT
Start: 2023-04-06 | End: 2023-04-08 | Stop reason: HOSPADM

## 2023-04-06 RX ORDER — SPIRONOLACTONE 25 MG/1
25 TABLET ORAL NIGHTLY
Status: DISCONTINUED | OUTPATIENT
Start: 2023-04-06 | End: 2023-04-08 | Stop reason: HOSPADM

## 2023-04-06 RX ORDER — MEPERIDINE HYDROCHLORIDE 25 MG/ML
25 INJECTION INTRAMUSCULAR; INTRAVENOUS; SUBCUTANEOUS EVERY 10 MIN PRN
Status: DISCONTINUED | OUTPATIENT
Start: 2023-04-06 | End: 2023-04-06 | Stop reason: HOSPADM

## 2023-04-06 RX ORDER — ROPIVACAINE HYDROCHLORIDE 7.5 MG/ML
INJECTION, SOLUTION EPIDURAL; PERINEURAL
Status: COMPLETED | OUTPATIENT
Start: 2023-04-06 | End: 2023-04-06

## 2023-04-06 RX ORDER — DEXAMETHASONE SODIUM PHOSPHATE 4 MG/ML
INJECTION, SOLUTION INTRA-ARTICULAR; INTRALESIONAL; INTRAMUSCULAR; INTRAVENOUS; SOFT TISSUE
Status: DISCONTINUED | OUTPATIENT
Start: 2023-04-06 | End: 2023-04-06

## 2023-04-06 RX ORDER — KETOROLAC TROMETHAMINE 30 MG/ML
INJECTION, SOLUTION INTRAMUSCULAR; INTRAVENOUS
Status: DISCONTINUED | OUTPATIENT
Start: 2023-04-06 | End: 2023-04-06

## 2023-04-06 RX ORDER — SODIUM CHLORIDE 9 MG/ML
75 INJECTION, SOLUTION INTRAVENOUS CONTINUOUS
Status: DISCONTINUED | OUTPATIENT
Start: 2023-04-06 | End: 2023-04-08 | Stop reason: HOSPADM

## 2023-04-06 RX ORDER — LOSARTAN POTASSIUM 25 MG/1
25 TABLET ORAL NIGHTLY
Status: DISCONTINUED | OUTPATIENT
Start: 2023-04-06 | End: 2023-04-08 | Stop reason: HOSPADM

## 2023-04-06 RX ORDER — POTASSIUM CHLORIDE 750 MG/1
10 TABLET, EXTENDED RELEASE ORAL ONCE
Status: COMPLETED | OUTPATIENT
Start: 2023-04-06 | End: 2023-04-06

## 2023-04-06 RX ORDER — SODIUM CHLORIDE, SODIUM LACTATE, POTASSIUM CHLORIDE, CALCIUM CHLORIDE 600; 310; 30; 20 MG/100ML; MG/100ML; MG/100ML; MG/100ML
INJECTION, SOLUTION INTRAVENOUS CONTINUOUS
Status: DISCONTINUED | OUTPATIENT
Start: 2023-04-06 | End: 2023-04-06

## 2023-04-06 RX ORDER — EPHEDRINE SULFATE 50 MG/ML
INJECTION, SOLUTION INTRAVENOUS
Status: DISCONTINUED | OUTPATIENT
Start: 2023-04-06 | End: 2023-04-06

## 2023-04-06 RX ORDER — ATORVASTATIN CALCIUM 10 MG/1
10 TABLET, FILM COATED ORAL DAILY
Status: DISCONTINUED | OUTPATIENT
Start: 2023-04-06 | End: 2023-04-08 | Stop reason: HOSPADM

## 2023-04-06 RX ORDER — BISACODYL 10 MG
10 SUPPOSITORY, RECTAL RECTAL DAILY PRN
Status: DISCONTINUED | OUTPATIENT
Start: 2023-04-06 | End: 2023-04-08 | Stop reason: HOSPADM

## 2023-04-06 RX ORDER — BUPIVACAINE HYDROCHLORIDE 7.5 MG/ML
INJECTION, SOLUTION EPIDURAL; RETROBULBAR
Status: COMPLETED | OUTPATIENT
Start: 2023-04-06 | End: 2023-04-06

## 2023-04-06 RX ORDER — PROPOFOL 10 MG/ML
VIAL (ML) INTRAVENOUS
Status: DISCONTINUED | OUTPATIENT
Start: 2023-04-06 | End: 2023-04-06

## 2023-04-06 RX ORDER — ONDANSETRON 2 MG/ML
INJECTION INTRAMUSCULAR; INTRAVENOUS
Status: DISCONTINUED | OUTPATIENT
Start: 2023-04-06 | End: 2023-04-06

## 2023-04-06 RX ORDER — ONDANSETRON 2 MG/ML
4 INJECTION INTRAMUSCULAR; INTRAVENOUS DAILY PRN
Status: DISCONTINUED | OUTPATIENT
Start: 2023-04-06 | End: 2023-04-06 | Stop reason: HOSPADM

## 2023-04-06 RX ADMIN — SPIRONOLACTONE 25 MG: 25 TABLET ORAL at 08:04

## 2023-04-06 RX ADMIN — ATORVASTATIN CALCIUM 10 MG: 10 TABLET, FILM COATED ORAL at 05:04

## 2023-04-06 RX ADMIN — FENTANYL CITRATE 50 MCG: 50 INJECTION INTRAMUSCULAR; INTRAVENOUS at 10:04

## 2023-04-06 RX ADMIN — APIXABAN 5 MG: 5 TABLET, FILM COATED ORAL at 08:04

## 2023-04-06 RX ADMIN — VANCOMYCIN HYDROCHLORIDE 2000 MG: 1 INJECTION, POWDER, LYOPHILIZED, FOR SOLUTION INTRAVENOUS at 10:04

## 2023-04-06 RX ADMIN — SODIUM CHLORIDE, POTASSIUM CHLORIDE, SODIUM LACTATE AND CALCIUM CHLORIDE 125 ML/HR: 600; 310; 30; 20 INJECTION, SOLUTION INTRAVENOUS at 10:04

## 2023-04-06 RX ADMIN — KETOROLAC TROMETHAMINE 60 MG: 30 INJECTION, SOLUTION INTRAMUSCULAR at 10:04

## 2023-04-06 RX ADMIN — GLYCOPYRROLATE 0.2 MG: 0.2 INJECTION INTRAMUSCULAR; INTRAVENOUS at 10:04

## 2023-04-06 RX ADMIN — DOCUSATE SODIUM 100 MG: 100 CAPSULE, LIQUID FILLED ORAL at 08:04

## 2023-04-06 RX ADMIN — EPHEDRINE SULFATE 10 MG: 50 INJECTION INTRAVENOUS at 10:04

## 2023-04-06 RX ADMIN — POTASSIUM CHLORIDE 10 MEQ: 750 TABLET, FILM COATED, EXTENDED RELEASE ORAL at 05:04

## 2023-04-06 RX ADMIN — SODIUM CHLORIDE: 9 INJECTION, SOLUTION INTRAVENOUS at 07:04

## 2023-04-06 RX ADMIN — EPINEPHRINE 0.01 MCG: 1 INJECTION, SOLUTION, CONCENTRATE INTRAVENOUS at 10:04

## 2023-04-06 RX ADMIN — ROPIVACAINE HYDROCHLORIDE 30 ML: 7.5 INJECTION, SOLUTION EPIDURAL; PERINEURAL at 10:04

## 2023-04-06 RX ADMIN — EPHEDRINE SULFATE 20 MG: 50 INJECTION INTRAVENOUS at 10:04

## 2023-04-06 RX ADMIN — CEFAZOLIN 2 G: 2 INJECTION, POWDER, FOR SOLUTION INTRAMUSCULAR; INTRAVENOUS at 05:04

## 2023-04-06 RX ADMIN — PROPOFOL 50 MG: 10 INJECTION, EMULSION INTRAVENOUS at 10:04

## 2023-04-06 RX ADMIN — MIDAZOLAM HYDROCHLORIDE 2 MG: 5 INJECTION, SOLUTION INTRAMUSCULAR; INTRAVENOUS at 10:04

## 2023-04-06 RX ADMIN — LIDOCAINE HYDROCHLORIDE 50 MG: 20 INJECTION, SOLUTION EPIDURAL; INFILTRATION; INTRACAUDAL; PERINEURAL at 10:04

## 2023-04-06 RX ADMIN — ONDANSETRON 8 MG: 2 INJECTION INTRAMUSCULAR; INTRAVENOUS at 10:04

## 2023-04-06 RX ADMIN — MORPHINE SULFATE 4 MG: 4 INJECTION, SOLUTION INTRAMUSCULAR; INTRAVENOUS at 10:04

## 2023-04-06 RX ADMIN — ASPIRIN 81 MG: 81 TABLET, COATED ORAL at 05:04

## 2023-04-06 RX ADMIN — VANCOMYCIN HYDROCHLORIDE 1000 MG: 1 INJECTION, POWDER, LYOPHILIZED, FOR SOLUTION INTRAVENOUS at 10:04

## 2023-04-06 RX ADMIN — DEXAMETHASONE SODIUM PHOSPHATE 8 MG: 4 INJECTION, SOLUTION INTRA-ARTICULAR; INTRALESIONAL; INTRAMUSCULAR; INTRAVENOUS; SOFT TISSUE at 10:04

## 2023-04-06 RX ADMIN — SODIUM CHLORIDE 75 ML/HR: 9 INJECTION, SOLUTION INTRAVENOUS at 05:04

## 2023-04-06 RX ADMIN — BUPIVACAINE HYDROCHLORIDE 1.25 ML: 7.5 INJECTION, SOLUTION EPIDURAL; RETROBULBAR at 10:04

## 2023-04-06 RX ADMIN — HYDROCODONE BITARTRATE AND ACETAMINOPHEN 1 TABLET: 5; 325 TABLET ORAL at 05:04

## 2023-04-06 RX ADMIN — CEFAZOLIN 2 G: 1 INJECTION, POWDER, FOR SOLUTION INTRAMUSCULAR; INTRAVENOUS; PARENTERAL at 10:04

## 2023-04-06 RX ADMIN — TRANEXAMIC ACID 1000 MG: 100 INJECTION, SOLUTION INTRAVENOUS at 10:04

## 2023-04-06 RX ADMIN — TRANEXAMIC ACID 1000 MG: 100 INJECTION, SOLUTION INTRAVENOUS at 01:04

## 2023-04-06 RX ADMIN — LOSARTAN POTASSIUM 25 MG: 25 TABLET, FILM COATED ORAL at 08:04

## 2023-04-06 NOTE — DISCHARGE INSTRUCTIONS
ANKLE: Pumps        Point toes down, then up. Repeat 30 times, 2 sessions per day        Quad Set        With other leg bent, foot flat, slowly tighten muscles on left thigh of straight leg while counting out loud to 5.  Repeat 30 times, 2 sessions per day.          Hip Abduction / Adduction: with Extended Knee (Supine)        Bring left leg out to side and return. Keep knee straight.  Repeat 30 times, 2 sessions per day.         HIP / KNEE: Flexion, Heel Slides - Supine        Slide left heel up toward buttocks, keeping leg in straight line. Repeat 30 times, 2 sessions per day.  Use towel or pillowcase under heel as needed.       Straight Leg Raise        Bend right leg. Raise left leg 8-12 inches with knee locked. Exhale and tighten thigh muscles while raising leg.   Repeat 30 times, 2 sessions per day.           KNEE: Extension, Long Arc Quads - Sitting        Raise left leg until knee is straight.  Repeat 30 times, 2 sessions per day        KNEE: Flexion / Extension - Sitting        Sit at edge of surface, foot on towel or pillowcase. Bend and straighten left knee.  Repeat 30 times, 2 sessions per day.   Use opposite leg to increase knee flexion.      *Keep dressing dry and intact, do not remove dressing, if dressing becomes wet or bloody notify home health staff.  Swingbed/Home Health will remove your drain (if you have one) on post op day #2 and change your dressing on post op day #3 and day #10, give them that special dressing we sent home with you.  *Continue incentive spirometry at least every 2 hours while awake.  *Continue white stockings remove 2 times a day for 1 hour and replace. Once dressing is changed, apply other stocking to surgery leg.   *Continue cool-jet to knee. Do not apply directly to skin.   *Take laxative of choice to have a bowel movement at least by tomorrow and then every other day.  *Increase fluids by mouth.  *Staples will be removed by home health/swingbed staff 2 weeks from surgery  prior to follow up appoinment.  *Elevate surgery leg on pillow at ankle. No pillow under knees.  *Notify swingbed/home health staff of any concerns.

## 2023-04-06 NOTE — NURSING
Notified Darline DELGADO that [facility] called and they need a copy of [place] approval for PT, Tamara is the one who called, her number is [phone], and fax number is [phone]. they have the pt order

## 2023-04-06 NOTE — INTERVAL H&P NOTE
The patient has been examined and the H&P has been reviewed:    I concur with the findings and no changes have occurred since H&P was written.    Surgery risks, benefits and alternative options discussed and understood by patient/family.          Active Hospital Problems    Diagnosis  POA    *Primary osteoarthritis of both knees [M17.0]  Yes      Resolved Hospital Problems   No resolved problems to display.

## 2023-04-06 NOTE — PT/OT/SLP PROGRESS
Occupational Therapy      Patient Name:  Rex Hassan   MRN:  [id number]    Patient not seen today secondary to Therapist assessment. Pt's legs are still numb and weak from his spinal block. Pt is not safe to attempt to get up.  This is pt's second total knee replacement. Pt also had a (L) THR as well in the past. Pt has the equipment he needs. Pt lives with his spouse and will have her assistance at home. Pt has not concerns about D/C to home and plans to have Home health PT at discharge.    4/6/2023

## 2023-04-06 NOTE — TRANSFER OF CARE
Anesthesia Transfer of Care Note    Patient: Rex Hsasan    Procedure(s) Performed: Procedure(s) (LRB):  ARTHROPLASTY, KNEE, TOTAL, USING COMPUTER-ASSISTED NAVIGATION (Left)    Patient location: PACU    Anesthesia Type: general    Transport from OR: Transported from OR on 100% O2 by closed face mask with adequate spontaneous ventilation    Post pain: adequate analgesia    Post assessment: no apparent anesthetic complications    Post vital signs: stable    Level of consciousness: responds to stimulation    Nausea/Vomiting: no nausea/vomiting    Complications: none    Transfer of care protocol was followed      Last vitals:   Visit Vitals  /82 (BP Location: Right arm, Patient Position: Lying)   Pulse 93   Temp 36.6 °C (97.8 °F) (Oral)   Resp 18   Ht 6' (1.829 m)   Wt 107 kg (236 lb)   SpO2 97%   BMI 32.01 kg/m²

## 2023-04-06 NOTE — PLAN OF CARE
[facility] - Orthopedic Periop Services  Initial Discharge Assessment       Primary Care Provider: Rex Joe MD    Admission Diagnosis: Acute pain of left knee [M25.562]  Primary osteoarthritis of both knees [M17.0]    Admission Date: 4/6/2023  Expected Discharge Date:     Discharge Barriers Identified: None    Payor: [employer] / Plan: [place] CCN OPTUM / Product Type: Government /     Extended Emergency Contact Information  Primary Emergency Contact: KEO KNOX  Mobile Phone: [phone]  Relation: Son  Preferred language: English   needed? No    Discharge Plan A: Other (home with outpatient therapy)  Discharge Plan B: Home (outpatient therapy)      [facility] - [place], [place] - [address]  [place] [address]  Phone: [phone] Fax: [phone]     [facility] - [place], [place] - [address]  [address]  [place] [address]  Phone: [phone] Fax: [phone]    [facility] - [place], [place] - [address]  [address]  [place] [address]  Phone: [phone] Fax: [phone]      Initial Assessment (most recent)       Adult Discharge Assessment - 04/06/23 0955          Discharge Assessment    Assessment Type Discharge Planning Assessment     Source of Information patient     People in Home significant other     Do you expect to return to your current living situation? Yes     Do you have help at home or someone to help you manage your care at home? Yes     Who are your caregiver(s) and their phone number(s)? Michell Saleh ([phone]),     Prior to hospitilization cognitive status: Unable to Assess     Current cognitive status: Alert/Oriented     Home Accessibility wheelchair accessible     Home Layout Able to live on 1st floor     Equipment Currently Used at Home walker, rolling;bedside commode     Readmission within 30 days? No     Patient currently being followed by outpatient case  management? Unable to determine (comments)     Do you currently have service(s) that help you manage your care at home? No     Do you take prescription medications? Yes     Do you have prescription coverage? Yes     Do you have any problems affording any of your prescribed medications? No     Who is going to help you get home at discharge? Michell Saleh ([phone]),     How do you get to doctors appointments? car, drives self;family or friend will provide     Are you on dialysis? No     Do you take coumadin? No     Discharge Plan A Other   home with outpatient therapy    Discharge Plan B Home   outpatient therapy    DME Needed Upon Discharge  none     Discharge Plan discussed with: Patient     Discharge Barriers Identified None        Physical Activity    On average, how many days per week do you engage in moderate to strenuous exercise (like a brisk walk)? 0 days     On average, how many minutes do you engage in exercise at this level? 0 min        Financial Resource Strain    How hard is it for you to pay for the very basics like food, housing, medical care, and heating? Not hard at all        Housing Stability    In the last 12 months, was there a time when you were not able to pay the mortgage or rent on time? No     In the last 12 months, how many places have you lived? 1        Transportation Needs    In the past 12 months, has lack of transportation kept you from medical appointments or from getting medications? No     In the past 12 months, has lack of transportation kept you from meetings, work, or from getting things needed for daily living? No        Food Insecurity    Within the past 12 months, you worried that your food would run out before you got the money to buy more. Never true     Within the past 12 months, the food you bought just didn't last and you didn't have money to get more. Never true        Stress    Do you feel stress - tense, restless, nervous, or anxious, or unable to sleep at night  because your mind is troubled all the time - these days? Not at all        Social Connections    In a typical week, how many times do you talk on the phone with family, friends, or neighbors? More than three times a week     How often do you get together with friends or relatives? Never     How often do you attend Druze or Taoist services? 1 to 4 times per year     Do you belong to any clubs or organizations such as Druze groups, unions, fraternal or athletic groups, or school groups? No     How often do you attend meetings of the clubs or organizations you belong to? Never     Are you , , , , never , or living with a partner? Never         Alcohol Use    Q1: How often do you have a drink containing alcohol? 2-3 times a week     Q2: How many drinks containing alcohol do you have on a typical day when you are drinking? 1 or 2     Q3: How often do you have six or more drinks on one occasion? Never        OTHER    Name(s) of People in Home Michell Saleh ([phone]),                   SW spoke with pt at bedside. Pt's girlfriend Michell Saleh ([phone]), was present. No , has rw and bsc. Dcp is to return home and receive outpt therapy at [facility]. Ss following for further dc needs.

## 2023-04-06 NOTE — ANESTHESIA PREPROCEDURE EVALUATION
04/06/2023  Rex Hassan is a 62 y.o., male.      Pre-op Assessment    I have reviewed the Patient Summary Reports.     I have reviewed the Nursing Notes. I have reviewed the NPO Status.   I have reviewed the Medications.     Review of Systems  Anesthesia Hx:  No problems with previous Anesthesia    Social:  Non-Smoker, No Alcohol Use    Hematology/Oncology:  Hematology Normal   Oncology Normal     EENT/Dental:EENT/Dental Normal   Cardiovascular:   Hypertension hyperlipidemia    Pulmonary:  Pulmonary Normal    Renal/:  Renal/ Normal     Hepatic/GI:  Hepatic/GI Normal    Musculoskeletal:   Arthritis     Neurological:  Neurology Normal    Endocrine:  Endocrine Normal    Dermatological:  Skin Normal    Psych:  Psychiatric Normal           Physical Exam  General: Well nourished    Airway:  Mallampati: II / II  Mouth Opening: Normal  TM Distance: > 6 cm  Tongue: Normal  Neck ROM: Normal ROM    Chest/Lungs:  Clear to auscultation, Normal Respiratory Rate    Heart:  Rate: Normal  Rhythm: Regular Rhythm        Anesthesia Plan  Type of Anesthesia, risks & benefits discussed:    Anesthesia Type: Gen Supraglottic Airway, Spinal, Regional  Intra-op Monitoring Plan: Standard ASA Monitors  Post Op Pain Control Plan: multimodal analgesia  Induction:  IV  Informed Consent: Informed consent signed with the Patient and all parties understand the risks and agree with anesthesia plan.  All questions answered. Patient consented to blood products? Yes  ASA Score: 2  Day of Surgery Review of History & Physical: H&P Update referred to the surgeon/provider.I have interviewed and examined the patient. I have reviewed the patient's H&P dated: There are no significant changes. H&P completed by Anesthesiologist.    Ready For Surgery From Anesthesia Perspective.     .

## 2023-04-06 NOTE — ANESTHESIA POSTPROCEDURE EVALUATION
Anesthesia Post Evaluation    Patient: Rex Hassan    Procedure(s) Performed: Procedure(s) (LRB):  ARTHROPLASTY, KNEE, TOTAL, USING COMPUTER-ASSISTED NAVIGATION (Left)    Final Anesthesia Type: spinal      Patient location during evaluation: labor & delivery  Patient participation: Yes- Able to Participate  Level of consciousness: awake and alert and oriented  Post-procedure vital signs: reviewed and stable  Pain management: adequate  Airway patency: patent  CHRISTY mitigation strategies: Multimodal analgesia and Use of major conduction anesthesia (spinal/epidural) or peripheral nerve block  PONV status at discharge: No PONV  Anesthetic complications: yes  Perioperative Events: other periop events (see comments)      Ngozi-operative Events Comments: Right eye corneal irritation - patient complains of increased lacrimation, photosensitivity, and discomfort in the right eye. No vision changes are noted. No obvious abrasion or foreign body noted. However, right corneal surface is inflamed.     Right eye packed with lacrilube and soft eye patch applied. Remainder of the lubricant sent with patient to his post operative room # 429. Patient advised to wear patch overnight and eye to be re-assessed by medicine team in the morning. Nursing order entered for repeat eye assessment in the morning.  Cardiovascular status: hemodynamically stable  Respiratory status: unassisted and spontaneous ventilation  Hydration status: euvolemic  Follow-up not needed.          Vitals Value Taken Time   /85 04/06/23 1432   Temp 36.6 °C (97.8 °F) 04/06/23 1402   Pulse 86 04/06/23 1433   Resp 12 04/06/23 1430   SpO2 91 % 04/06/23 1433   Vitals shown include unvalidated device data.      No case tracking events are documented in the log.      Pain/Marissa Score: Marissa Score: 9 (4/6/2023  2:25 PM)

## 2023-04-06 NOTE — PT/OT/SLP PROGRESS
Physical Therapy      Patient Name:  Rex Hassan   MRN:  [id number]    Patient not seen today secondary to Therapist assessment. Patient with poor kinesthetic awareness and lack of full motor return post spinal anaesthesia. Patient unsafe to attempt OOB this evening. Will follow-up tomorrow.

## 2023-04-06 NOTE — ANESTHESIA PROCEDURE NOTES
Intubation    Date/Time: 4/6/2023 10:26 AM  Performed by: Yumi Baig CRNA  Authorized by: Juan A Ferrer MD     Intubation:     Induction:  Intravenous    Intubated:  Postinduction    Mask Ventilation:  Easy mask    Attempts:  1    Attempted By:  CRNA    Difficult Airway Encountered?: No      Complications:  None    Airway Device:  Supraglottic airway/LMA    Airway Device Size:  4.0    Style/Cuff Inflation:  Cuffed (inflated to minimal occlusive pressure)    Placement Verified By:  Capnometry    Complicating Factors:  None    Findings Post-Intubation:  BS equal bilateral

## 2023-04-06 NOTE — ANESTHESIA PROCEDURE NOTES
Spinal    Diagnosis: lt tka  Patient location during procedure: OR  Start time: 4/6/2023 10:31 AM  Timeout: 4/6/2023 10:31 AM  End time: 4/6/2023 10:31 AM    Staffing  Authorizing Provider: Juan A Ferrer MD  Performing Provider: Juan A Ferrer MD    Preanesthetic Checklist  Completed: patient identified, IV checked, risks and benefits discussed, surgical consent, monitors and equipment checked, pre-op evaluation and timeout performed  Spinal Block  Patient position: sitting  Prep: Betadine  Patient monitoring: heart rate, continuous pulse ox, continuous capnometry and frequent blood pressure checks  Approach: midline  Location: L3-4  Injection technique: single shot  CSF Fluid: clear free-flowing CSF  Needle  Needle type: Quincke   Needle gauge: 22 G  Needle length: 4 in  Needle localization: anatomical landmarks  Assessment  Sensory level: T8   Dermatomal levels determined by alcohol wipe  Ease of block: easy  Patient's tolerance of the procedure: comfortable throughout block and no complaints  Medications:    Medications: BUPivacaine (pf) (MARCAINE) injection 0.75% - Intraspinal   1.25 mL - 4/6/2023 10:10:00 AM

## 2023-04-06 NOTE — OP NOTE
[facility] - Orthopedic Periop Services  General Surgery  Operative Note    SUMMARY     Date of Procedure: 4/6/2023     Procedure: Procedure(s) (LRB):  ARTHROPLASTY, KNEE, TOTAL, USING COMPUTER-ASSISTED NAVIGATION (Left)       Surgeon(s) and Role:     * Rex Joe MD - Primary    Assisting Surgeon: None    Pre-Operative Diagnosis: Acute pain of left knee [M25.562]    Post-Operative Diagnosis: Post-Op Diagnosis Codes:     * Acute pain of left knee [M25.562]    Anesthesia: General    Operative Findings (including complications, if any):         OPERATIVE REPORT    SURGERY DATE:  4/6/2023    PRE-OP DIAGNOSIS:  Acute pain of left knee [M25.562]    POST-OP DIAGNOSIS:  Post-Op Diagnosis Codes:     * Acute pain of left knee [M25.562]    PROCEDURE:  Procedure(s) (LRB):  ARTHROPLASTY, KNEE, TOTAL, USING COMPUTER-ASSISTED NAVIGATION (Left)    SURGEON:  Rex Joe M.D.    Assisting Surgeon:      ANESTHESIA:  General    BLOOD LOSS:  100cc    TOURNIQUET TIME:  90min    COMPLICATIONS:  None.    IMPLANTS PLACED:    Implant Name Type Inv. Item Serial No.  Lot No. LRB No. Used Action   CEMENT BONE SURG SMPLX P RADPQ - [id number]  CEMENT BONE SURG SMPLX P RADPQ  CLAUDIO Epoch MARCIE. [id number] Left 1 Implanted   CEMENT BONE SURG SMPLX P RADPQ - [id number]  CEMENT BONE SURG SMPLX P RADPQ  CLAUDIO Epoch MARCIE. [id number] Left 1 Implanted   GUIDEPIN 3.20MM 4 GUCCI SQUARE - [id number]  GUIDEPIN 3.20MM 4 GUCCI [facility]  [facility]  Left 1 Implanted and Explanted   GUIDEPIN 3.20MM 4 GUCCI SQUARE - [id number]  GUIDEPIN 3.20MM 4 GUCCI [facility]  [facility] ([facility])  Left 1 Implanted and Explanted   BASEPLATE TIB SAMANTHA PRIM SZ 7 - [id number]  BASEPLATE TIB SAMANTHA PRIM SZ 7  CLAUDIO Epoch MARCIE. [id number] Left 1 Implanted   PATELLA TRIATHLON ASYM 31X9MM - [id number]  PATELLA TRIATHLON ASYM 31X9MM  Yedda MARCIE. [id number] Left 1 Implanted   COMPONENT CR FEM CEMENTED 6 L - [id number]  COMPONENT CR FEM CEMENTED 6 L  Yedda  MARCIE. [id number] Left 1 Implanted   CLAUDIO TRIATHLON TIBIAL BEARING INSERT-CS SIZE #7 TYP CS THKNS 16 MM     [id number] Left 1 Implanted       INDICATIONS:  Patient is a 62 y.o. year old male with severe degenerative joint disease of the left knee needing total knee arthroplasty.    PROCEDURE IN DETAIL:  After having the risks and benefits of the procedure explained at length to the patient and the patient stating that he understands the risks and benefits of the procedure and wishes to proceed with the procedure, written informed consent was obtained.  The patient was taken to the Operating room and placed on the Operative table at which time General was induced per anesthesia.  The patient then had a sandbag taped on the bed so the knee could be held at 90 degrees of flexion.  A tourniquet was placed over cast padding on the proximal left thigh.  The left lower extremity was prepped and draped in sterile fashion.  It was elevated and exsanguinated with an Esmarch bandage.    At this time a 15 centimeter incision was made centered over the patella going from the tibial tubercle mid-line to approximately 4-5 centimeters superior to the superior pole of the patella.  Skin incision was made with a #10 blade and was taken down to the anterior retinaculum of the patella.  At this point a medial flap was elevated up using the #10 blade.  At this point the knee was flexed up and the quadriceps tendon was identified and a medial arthrotomy starting approximately a centimeter superior to the superior pole of the patella and going over the media border of the patella and the medial border of the patella going down to the tibial tubercle was made using a #10 blade. At this point the joint opened up and there was noted to be severe DJD of the knee.  The knee was then flexed up.  The remnants of the medial and lateral meniscus were removed, leaving minimal meniscus remaining.  The osteophytes off of the femur were removed using a  ruy.    The ASM navigation block was pinned on to the femur.  The cutting block was then positioned and aligned based on the computer alignment.  It was pinned in place and the distal femoral cut was made. Next the tibial guide was placed at the ASM block pinned to the tibia.  The cutting block was in position based on the computer navigation.  Cutting block pin place and the tibial cut was made.  At this point the knee was noted to be balanced with proper flexion and extension gaps.  The sizing block was then placed onto the distal femur.  It was pinned in place setting the 30 degrees of external rotation.  At this point the cutting block was noted to be a size 6 based off the sizer.  The sizer was removed.  The cutting block was placed.  The anterior cut followed by the anterior chamfer posterior cut followed by the posterior chamfer cut was made.  Trial femur was placed.  Trial tibial tray was placed.  It was a size 7 with a trial 16 millimeter poly.  The knee was noted to be balanced at this point.  The tibia was then prepped placing the tibial plate guide onto the proximal tibia.  It was then punched.  It was then punched comparing the size 6 tibial tray.  Once this was done the distal femur  holes were drilled based off of the trial femoral component.  At this point all trial components were removed.  The patella was resurfaced removing 9 millimeter of bone.  A 35 millimeter all polyethylene patella was inserted and peg holes drilled.  The knee was put through range of motion.  The patella was noted to track appropriately with good alignment and was felt to be stable.  The wounds were irrigated out with copious amounts of normal saline.  The tibia was cemented first removing excess cement followed by the femur.  The cement was allowed to polymerize and the tibial insert was placed.  The knee was irrigated out with copious amounts of normal saline.  The knee was noted to be tracking in the correct  position and alignment with good motion.     At this point two medium Hemovac drains were placed in the joint coming out through the skin.  Figure of eight #1 Vicryl interrupted sutures were used to close the arthrotomy. Subcuticular stitches of 2-0 Vicryl followed by skin staples were used to closed the skin.  A sterile occlusive dressing was placed followed by a Cryo-Cuff.  The patient was then taken from the Operative table and taken to the Recovery Room in good condition.  All counts are correct. There were no complications.         Description of Technical Procedures:     Significant Surgical Tasks Conducted by the Assistant(s), if Applicable:     Estimated Blood Loss (EBL): * No values recorded between 4/6/2023 11:27 AM and 4/6/2023  1:39 PM *100cc           Implants:   Implant Name Type Inv. Item Serial No.  Lot No. LRB No. Used Action   CEMENT BONE SURG SMPLX P RADPQ - [id number]  CEMENT BONE SURG SMPLX P RADPQ  CLAUDIO [employer] MARCIE. [id number] Left 1 Implanted   CEMENT BONE SURG SMPLX P RADPQ - [id number]  CEMENT BONE SURG SMPLX P RADPQ  CLAUDIO [employer] MARCIE. [id number] Left 1 Implanted   GUIDEPIN 3.20MM 4 GUCCI SQUARE - [id number]  GUIDEPIN 3.20MM 4 GUCCI SQUARE  [employer] [facility] ([facility])  Left 1 Implanted and Explanted   GUIDEPIN 3.20MM 4 GUCCI SQUARE - [id number]  GUIDEPIN 3.20MM 4 GUCCI SQUARE  [facility] ([facility])  Left 1 Implanted and Explanted   BASEPLATE TIB SAMANTHA PRIM SZ 7 - [id number]  BASEPLATE TIB SAMANTHA PRIM SZ 7  CLAUDIO [employer] MARCIE. [id number] Left 1 Implanted   PATELLA TRIATHLON ASYM 31X9MM - [id number]  PATELLA TRIATHLON ASYM 31X9MM  CLAUDIO [employer] MARCIE. [id number] Left 1 Implanted   COMPONENT CR FEM CEMENTED 6 L - [id number]  COMPONENT CR FEM CEMENTED 6 L  CLAUDIO [employer] MARCIE. [id number] Left 1 Implanted   CLAUDIO TRIATHLON TIBIAL BEARING INSERT-CS SIZE #7 TYP CS THKNS 16 MM     [id number] Left 1 Implanted       Specimens:   Specimen (24h ago, onward)      None                    Condition: Good    Disposition:  PACU - hemodynamically stable.    Attestation: I was present and scrubbed for the entire procedure.

## 2023-04-06 NOTE — NURSING
Phoned Dr Joe office, spoke with Jazmin, Asked to speak with Maggie Castillo, apparently Maggie was unable to answer phone, left message with Jazmin for Maggie Castillo to fax a OT/PT order for LTKR TO [facility] in [place].

## 2023-04-06 NOTE — PLAN OF CARE
Consult acknowledged for dcp . Pt owns rw and bsc from prior surgery. Pt requests outpatient therapy at [facility] in [place], [place]. SS following.

## 2023-04-06 NOTE — PLAN OF CARE
1355 pt to pacu pt resp reg and non labored pt dsg d/I l knee pt sao2 97% on 7l fm pt pain level 0 spinal level at l 2-3, coyne cath I ntact and draining well will monitor     1410 pt vs stable pt resting well pt sao2 95% on ra pt vs stable   pt c/o r eye irratation dr christy notified     1430 dr christy here to see pt     1440  refresh lacrilube applied to r eye and soft eye patch applied  pt vs stable will have hospitalist reassess in am    1445 pt vs stable pt resting well pt pain level 0 orders to release to room per md     1455 pt released to t anil rn b/p 134/74 pulse 85 resp 16 sao2 93% on ra

## 2023-04-06 NOTE — ANESTHESIA PROCEDURE NOTES
Peripheral Block    Patient location during procedure: OR   Block not for primary anesthetic.  Reason for block: at surgeon's request and post-op pain management   Post-op Pain Location: lt tka, p op pain control   Start time: 4/6/2023 10:16 AM  Timeout: 4/6/2023 10:15 AM   End time: 4/6/2023 10:22 AM    Staffing  Authorizing Provider: uJan A Ferrer MD  Performing Provider: Juan A Ferrer MD    Preanesthetic Checklist  Completed: patient identified, IV checked, site marked, risks and benefits discussed, surgical consent, monitors and equipment checked, pre-op evaluation and timeout performed  Peripheral Block  Patient position: supine  Prep: ChloraPrep  Patient monitoring: heart rate, cardiac monitor, continuous pulse ox, continuous capnometry and frequent blood pressure checks  Block type: adductor canal  Laterality: left  Injection technique: single shot  Needle  Needle type: Stimuplex   Needle gauge: 20 G  Needle length: 4 in  Needle localization: ultrasound guidance   -ultrasound image captured on disc.  Assessment  Injection assessment: negative aspiration, negative parasthesia and local visualized surrounding nerve  Paresthesia pain: none  Heart rate change: no  Slow fractionated injection: yes  Pain Tolerance: comfortable throughout block and no complaints  Medications:    Medications: ROPIvacaine (NAROPIN) injection 0.75% - Perineural   30 mL - 4/6/2023 10:20:00 AM

## 2023-04-07 PROBLEM — R55 POSTURAL DIZZINESS WITH PRESYNCOPE: Status: ACTIVE | Noted: 2023-04-07

## 2023-04-07 PROBLEM — R42 POSTURAL DIZZINESS WITH PRESYNCOPE: Status: ACTIVE | Noted: 2023-04-07

## 2023-04-07 LAB
ALBUMIN SERPL BCP-MCNC: 3.4 G/DL (ref 3.5–5)
ALBUMIN/GLOB SERPL: 1 {RATIO}
ALP SERPL-CCNC: 41 U/L (ref 45–115)
ALT SERPL W P-5'-P-CCNC: 22 U/L (ref 16–61)
ANION GAP SERPL CALCULATED.3IONS-SCNC: 13 MMOL/L (ref 7–16)
AST SERPL W P-5'-P-CCNC: 19 U/L (ref 15–37)
BACTERIA #/AREA URNS HPF: NORMAL /HPF
BASOPHILS # BLD AUTO: 0.01 K/UL (ref 0–0.2)
BASOPHILS NFR BLD AUTO: 0.1 % (ref 0–1)
BILIRUB SERPL-MCNC: 0.4 MG/DL (ref ?–1.2)
BILIRUB UR QL STRIP: NEGATIVE
BUN SERPL-MCNC: 19 MG/DL (ref 7–18)
BUN/CREAT SERPL: 17 (ref 6–20)
CALCIUM SERPL-MCNC: 8.9 MG/DL (ref 8.5–10.1)
CHLORIDE SERPL-SCNC: 105 MMOL/L (ref 98–107)
CLARITY UR: CLEAR
CO2 SERPL-SCNC: 27 MMOL/L (ref 21–32)
COLOR UR: ABNORMAL
CREAT SERPL-MCNC: 1.11 MG/DL (ref 0.7–1.3)
DIFFERENTIAL METHOD BLD: ABNORMAL
EGFR (NO RACE VARIABLE) (RUSH/TITUS): 75 ML/MIN/1.73M²
EOSINOPHIL # BLD AUTO: 0 K/UL (ref 0–0.5)
EOSINOPHIL NFR BLD AUTO: 0 % (ref 1–4)
ERYTHROCYTE [DISTWIDTH] IN BLOOD BY AUTOMATED COUNT: 14.2 % (ref 11.5–14.5)
GLOBULIN SER-MCNC: 3.4 G/DL (ref 2–4)
GLUCOSE SERPL-MCNC: 110 MG/DL (ref 74–106)
GLUCOSE UR STRIP-MCNC: >1000 MG/DL
HCT VFR BLD AUTO: 35.6 % (ref 40–54)
HGB BLD-MCNC: 11.4 G/DL (ref 13.5–18)
IMM GRANULOCYTES # BLD AUTO: 0.01 K/UL (ref 0–0.04)
IMM GRANULOCYTES NFR BLD: 0.1 % (ref 0–0.4)
KETONES UR STRIP-SCNC: NEGATIVE MG/DL
LEUKOCYTE ESTERASE UR QL STRIP: NEGATIVE
LYMPHOCYTES # BLD AUTO: 1.08 K/UL (ref 1–4.8)
LYMPHOCYTES NFR BLD AUTO: 15.4 % (ref 27–41)
MCH RBC QN AUTO: 27.9 PG (ref 27–31)
MCHC RBC AUTO-ENTMCNC: 32 G/DL (ref 32–36)
MCV RBC AUTO: 87.3 FL (ref 80–96)
MONOCYTES # BLD AUTO: 0.57 K/UL (ref 0–0.8)
MONOCYTES NFR BLD AUTO: 8.1 % (ref 2–6)
MPC BLD CALC-MCNC: ABNORMAL G/DL
NEUTROPHILS # BLD AUTO: 5.35 K/UL (ref 1.8–7.7)
NEUTROPHILS NFR BLD AUTO: 76.3 % (ref 53–65)
NITRITE UR QL STRIP: NEGATIVE
NRBC # BLD AUTO: 0 X10E3/UL
NRBC, AUTO (.00): 0 %
PH UR STRIP: 5.5 PH UNITS
PLATELET # BLD AUTO: 132 K/UL (ref 150–400)
POTASSIUM SERPL-SCNC: 5.1 MMOL/L (ref 3.5–5.1)
PROT SERPL-MCNC: 6.8 G/DL (ref 6.4–8.2)
PROT UR QL STRIP: 50
RBC # BLD AUTO: 4.08 M/UL (ref 4.6–6.2)
RBC # UR STRIP: NEGATIVE /UL
RBC #/AREA URNS HPF: NORMAL /HPF
SODIUM SERPL-SCNC: 140 MMOL/L (ref 136–145)
SP GR UR STRIP: 1.01
SQUAMOUS #/AREA URNS LPF: NORMAL /LPF
UROBILINOGEN UR STRIP-ACNC: NORMAL MG/DL
WBC # BLD AUTO: 7.02 K/UL (ref 4.5–11)
WBC #/AREA URNS HPF: NORMAL /[HPF]

## 2023-04-07 PROCEDURE — 85025 COMPLETE CBC W/AUTO DIFF WBC: CPT | Performed by: ORTHOPAEDIC SURGERY

## 2023-04-07 PROCEDURE — 94761 N-INVAS EAR/PLS OXIMETRY MLT: CPT

## 2023-04-07 PROCEDURE — 97110 THERAPEUTIC EXERCISES: CPT

## 2023-04-07 PROCEDURE — 99900035 HC TECH TIME PER 15 MIN (STAT)

## 2023-04-07 PROCEDURE — 99232 PR SUBSEQUENT HOSPITAL CARE,LEVL II: ICD-10-PCS | Mod: ,,, | Performed by: FAMILY MEDICINE

## 2023-04-07 PROCEDURE — 63600175 PHARM REV CODE 636 W HCPCS

## 2023-04-07 PROCEDURE — 63600175 PHARM REV CODE 636 W HCPCS: Performed by: ORTHOPAEDIC SURGERY

## 2023-04-07 PROCEDURE — 25000003 PHARM REV CODE 250: Performed by: ORTHOPAEDIC SURGERY

## 2023-04-07 PROCEDURE — 81001 URINALYSIS AUTO W/SCOPE: CPT | Performed by: FAMILY MEDICINE

## 2023-04-07 PROCEDURE — 97162 PT EVAL MOD COMPLEX 30 MIN: CPT

## 2023-04-07 PROCEDURE — 99232 SBSQ HOSP IP/OBS MODERATE 35: CPT | Mod: ,,, | Performed by: FAMILY MEDICINE

## 2023-04-07 PROCEDURE — 80053 COMPREHEN METABOLIC PANEL: CPT | Performed by: ORTHOPAEDIC SURGERY

## 2023-04-07 PROCEDURE — 87040 BLOOD CULTURE FOR BACTERIA: CPT | Mod: 91 | Performed by: FAMILY MEDICINE

## 2023-04-07 RX ORDER — SODIUM CHLORIDE 9 MG/ML
INJECTION, SOLUTION INTRAVENOUS ONCE
Status: COMPLETED | OUTPATIENT
Start: 2023-04-07 | End: 2023-04-07

## 2023-04-07 RX ORDER — HYDROCODONE BITARTRATE AND ACETAMINOPHEN 10; 325 MG/1; MG/1
1 TABLET ORAL EVERY 4 HOURS PRN
Qty: 30 TABLET | Refills: 0 | Status: SHIPPED | OUTPATIENT
Start: 2023-04-07 | End: 2023-04-21

## 2023-04-07 RX ADMIN — LOSARTAN POTASSIUM 25 MG: 25 TABLET, FILM COATED ORAL at 09:04

## 2023-04-07 RX ADMIN — HYDROCODONE BITARTRATE AND ACETAMINOPHEN 1 TABLET: 5; 325 TABLET ORAL at 02:04

## 2023-04-07 RX ADMIN — DOCUSATE SODIUM 100 MG: 100 CAPSULE, LIQUID FILLED ORAL at 09:04

## 2023-04-07 RX ADMIN — ASPIRIN 81 MG: 81 TABLET, COATED ORAL at 09:04

## 2023-04-07 RX ADMIN — APIXABAN 5 MG: 5 TABLET, FILM COATED ORAL at 09:04

## 2023-04-07 RX ADMIN — BUMETANIDE 1 MG: 1 TABLET ORAL at 09:04

## 2023-04-07 RX ADMIN — METOPROLOL SUCCINATE 25 MG: 25 TABLET, EXTENDED RELEASE ORAL at 09:04

## 2023-04-07 RX ADMIN — MORPHINE SULFATE 4 MG: 4 INJECTION, SOLUTION INTRAMUSCULAR; INTRAVENOUS at 06:04

## 2023-04-07 RX ADMIN — CEFAZOLIN 2 G: 2 INJECTION, POWDER, FOR SOLUTION INTRAMUSCULAR; INTRAVENOUS at 03:04

## 2023-04-07 RX ADMIN — SPIRONOLACTONE 25 MG: 25 TABLET ORAL at 09:04

## 2023-04-07 RX ADMIN — HYDROCODONE BITARTRATE AND ACETAMINOPHEN 1 TABLET: 5; 325 TABLET ORAL at 09:04

## 2023-04-07 RX ADMIN — ATORVASTATIN CALCIUM 10 MG: 10 TABLET, FILM COATED ORAL at 09:04

## 2023-04-07 RX ADMIN — SODIUM CHLORIDE: 9 INJECTION, SOLUTION INTRAVENOUS at 09:04

## 2023-04-07 NOTE — HPI
Rex Hassan is a 62-year-old male with history of hypertension, diabetes, and atrial flutter. He is status post left total knee arthoplasty by Dr. Joe on 04/06/23. Patient tolerated procedure well with no immediate postoperative complications. Medicine consulted for evaluation of orthostatic hypotension and syncope.     Patient states initial episodes occurred while attempting to stand and ambulate with physical therapy. Fluid bolus was given with improvement in blood pressure. Most recent occurred after he stood up from tying his shoes while getting dressed for discharge. He became dizzy and lightheaded and his knees buckled but he did not lose consciousness. He denies any chest pain or palpitations during the episode, states he has never experienced anything similar prior to this hospitalization. His arrhythmia is generally well controlled with medications. Orthostatic vitals recorded shortly after episode, patient did experience positive decrease in SBP and DBP without compensatory tachycardia. He also experience vasovagal symptoms during the test.

## 2023-04-07 NOTE — PT/OT/SLP PROGRESS
Physical Therapy Treatment    Patient Name:  Rex Hassan   MRN:  [id number]    Recommendations:     Discharge Recommendations:    Discharge Equipment Recommendations: none  Barriers to discharge: None    Assessment:     Rex Hassan is a 62 y.o. male admitted with a medical diagnosis of Primary osteoarthritis of both knees.  He presents with the following impairments/functional limitations: decreased ROM, gait instability, impaired endurance, pain Patient with orthostatic hypotension this am and persist this afternoon with symptoms. Hospitalist consulted.    Rehab Prognosis: Good; patient would benefit from acute skilled PT services to address these deficits and reach maximum level of function.    Recent Surgery: Procedure(s) (LRB):  ARTHROPLASTY, KNEE, TOTAL, USING COMPUTER-ASSISTED NAVIGATION (Left) 1 Day Post-Op    Plan:     During this hospitalization, patient to be seen daily to address the identified rehab impairments via gait training, therapeutic activities, therapeutic exercises and progress toward the following goals:    Plan of Care Expires:  05/08/23    Subjective     Chief Complaint: dizziness with standing  Patient/Family Comments/goals: Patient states he started new bp meds 2 weeks ago and has been having trouble getting bp regulated  Pain/Comfort:  Pain Rating 1: 2/10  Location - Side 1: Left  Location 1: knee  Pain Addressed 1: Cessation of Activity, Pre-medicate for activity      Objective:     Communicated with nurse prior to session.  Patient found supine with peripheral IV, telemetry upon PT entry to room.     General Precautions: Standard, fall  Orthopedic Precautions: LLE weight bearing as tolerated  Braces:    Respiratory Status: Room air     Functional Mobility:  BP: Supine 99/57, sit 88/50, stand 79/41      AM-PAC 6 CLICK MOBILITY  Turning over in bed (including adjusting bedclothes, sheets and blankets)?: 3  Sitting down on and standing up from a chair with arms (e.g., wheelchair, bedside  commode, etc.): 3  Moving from lying on back to sitting on the side of the bed?: 3  Moving to and from a bed to a chair (including a wheelchair)?: 3  Need to walk in hospital room?: 3  Climbing 3-5 steps with a railing?: 3  Basic Mobility Total Score: 18       Treatment & Education:  LLE; aps, qs, saq, slr, hs 3x10 10 passive flexion stretches with 10 sh  0-96 knee flexion    Patient left supine with call button in reach..    GOALS:   Multidisciplinary Problems       Physical Therapy Goals          Problem: Physical Therapy    Goal Priority Disciplines Outcome Goal Variances Interventions   Physical Therapy Goal     PT, PT/OT Ongoing, Progressing     Description: Short Term Goals  Independent with HEP  Independent with walkerx 100 feet FWB/WBAT: left lower extremity  0-100 knee flexion to allow normal sit to stand    Long term goals  Needed equipment for home.                            Time Tracking:     PT Received On: 04/07/23  PT Start Time: 1240     PT Stop Time: 1255  PT Total Time (min): 15 min     Billable Minutes: Therapeutic Activity 15    Treatment Type: Treatment  PT/PTA: PT     Number of PTA visits since last PT visit: 0     04/07/2023

## 2023-04-07 NOTE — PT/OT/SLP EVAL
Physical Therapy Evaluation    Patient Name:  Rex Hassan   MRN:  [id number]    Recommendations:     Discharge Recommendations:     Discharge Equipment Recommendations: none   Barriers to discharge: None    Assessment:     Rex Hassan is a 62 y.o. male admitted with a medical diagnosis of Primary osteoarthritis of both knees.  He presents with the following impairments/functional limitations: decreased ROM, gait instability, impaired endurance, pain Patient was orthostatic with oob mobility going syncopal x 2. Physician and nursing informed. Pain well controlled. Good initial rom.    Rehab Prognosis: Good; patient would benefit from acute skilled PT services to address these deficits and reach maximum level of function.    Recent Surgery: Procedure(s) (LRB):  ARTHROPLASTY, KNEE, TOTAL, USING COMPUTER-ASSISTED NAVIGATION (Left) 1 Day Post-Op    Plan:     During this hospitalization, patient to be seen daily to address the identified rehab impairments via gait training, therapeutic activities, therapeutic exercises and progress toward the following goals:    Plan of Care Expires:  05/08/23    Subjective     Chief Complaint: orthostatic hypotension  Patient/Family Comments/goals: Patient reports no previous history of passing out.   Pain/Comfort:  Pain Rating 1: 2/10  Location - Side 1: Left  Location 1: knee  Pain Addressed 1: Cessation of Activity, Pre-medicate for activity    Patients cultural, spiritual, Yarsanism conflicts given the current situation: no    Living Environment:  Lives with spouse  Prior to admission, patients level of function was independent.  Equipment used at home: walker, rolling.  DME owned (not currently used): rolling walker.  Upon discharge, patient will have assistance from spouse.    Objective:     Communicated with nurse prior to session.  Patient found up in chair with peripheral IV, telemetry  upon PT entry to room.    General Precautions: Standard, fall  Orthopedic Precautions:LLE  weight bearing as tolerated   Braces:    Respiratory Status: Room air    Exams:  Cognitive Exam:  Patient is oriented to Person, Place, Time, and Situation  Sensation:    -       Intact  RLE ROM: WNL  RLE Strength: WNL  LLE ROM: 0-98 knee flexion prom  LLE Strength: 3/5    Functional Mobility:  Bed Mobility:     Supine to Sit: minimum assistance  Sit to Supine: minimum assistance  Transfers:     Sit to Stand:  contact guard assistance with no AD and rolling walker  Gait: ambulated 20 feet with rolling walker cga  Patient became orthostatic with sitting and the end of ambulation.  BP 87/49 sitting      AM-PAC 6 CLICK MOBILITY  Total Score:18       Treatment & Education:  Wbat with walker      Patient left up in chair with call button in reach and wife present.    GOALS:   Multidisciplinary Problems       Physical Therapy Goals          Problem: Physical Therapy    Goal Priority Disciplines Outcome Goal Variances Interventions   Physical Therapy Goal     PT, PT/OT Ongoing, Progressing     Description: Short Term Goals  Independent with HEP  Independent with walkerx 100 feet FWB/WBAT: left lower extremity  0-100 knee flexion to allow normal sit to stand    Long term goals  Needed equipment for home.                            History:     Past Medical History:   Diagnosis Date    Acute pain of left knee     Atrial flutter     Diabetes mellitus     Hyperlipidemia     Hypertension        Past Surgical History:   Procedure Laterality Date    HERNIA REPAIR      X2    JOINT REPLACEMENT Right     knee    SHOULDER SURGERY Left        Time Tracking:     PT Received On: 04/07/23  PT Start Time: 0855     PT Stop Time: 0920  PT Total Time (min): 25 min     Billable Minutes: Evaluation 25 04/07/2023

## 2023-04-07 NOTE — ASSESSMENT & PLAN NOTE
Orthostatic hypotension with syncope during ambulation with PT, improvement in blood pressure following fluids bolus. Patient was preparing for discharge when he experienced another episode of light-headedness and near-syncope but did not lose consciousness. No other neurologic or cardiac symptoms.     Orthostatic vitals with positive decrease in SBP and DBP without compensatory tachycardia. Patient did experience vagal symptoms during test. Blood pressure WNL while at rest.    Will place on telemetry, continue IV fluids and monitor.

## 2023-04-07 NOTE — DISCHARGE SUMMARY
[facility] - Short Stay Unit  Orthopedics  Discharge Summary      Patient Name: Markell Hassan  MRN: [id number]  Admission Date: 4/6/2023  Hospital Length of Stay: 0 days  Discharge Date and Time:  04/07/2023 8:37 AM  Attending Physician: Markell Joe MD   Discharging Provider: Markell Joe MD  Primary Care Provider: Markell Joe MD    HPI:   No notes on file    Procedure(s) (LRB):  ARTHROPLASTY, KNEE, TOTAL, USING COMPUTER-ASSISTED NAVIGATION (Left)      Hospital Course:  No notes on file admitted to the hospital on 04/06/2023 underwent a total knee arthroplasty on the left without complication.  On postop day 1 he moves toes dorsiflexion plantar flexion.  Has sensation to touch.  Has palpable dorsalis pedis pulse.  His drain has been removed.  He is weightbear as tolerated on left lower extremity.  Diet is regular.  Eliquis for DVT prophylaxis.  [place] for pain.  DC home.  Follow-up in 3 weeks Dr. Joe.  Home health PT.  DC staples in 2 weeks.  No complications.    Goals of Care Treatment Preferences:  Code Status: Full Code      Consults (From admission, onward)        Status Ordering Provider     Inpatient consult to Hospitalist  Once        Provider:  (Not yet assigned)    Acknowledged MARKELL JOE     IP consult case management/social work  Once        Provider:  (Not yet assigned)    Completed MARKELL JOE          Significant Diagnostic Studies: Labs: All labs within the past 24 hours have been reviewed    Pending Diagnostic Studies:     None        Final Active Diagnoses:    Diagnosis Date Noted POA    PRINCIPAL PROBLEM:  Primary osteoarthritis of both knees [M17.0] 06/08/2022 Yes      Problems Resolved During this Admission:      Discharged Condition: good    Disposition: Home-Health Care [facility]    Follow Up:    Patient Instructions:   No discharge procedures on file.  Medications:  Reconciled Home Medications:      Medication List      ASK your doctor about these medications     apixaban 5 mg Tab  Commonly known as: ELIQUIS  5 mg.     aspirin 81 MG EC tablet  Commonly known as: ECOTRIN  Take 81 mg by mouth once daily.     bumetanide 1 MG tablet  Commonly known as: BUMEX  1 mg.     * cholecalciferol (vitamin D3) 100 mcg (4,000 unit) Tab  Take 10 mcg by mouth.     * cholecalciferol (vitamin D3) 10 mcg/mL (400 unit/mL) Drop  Commonly known as: VITAMIN D3  cholecalciferol (vit D3) 10 mcg/mL(400 unit/mL) oral syringe(ORAL USE)   Take by oral route as directed.     cyanocobalamin 1000 MCG tablet  Commonly known as: VITAMIN B-12  Take 1,000 mcg by mouth.     empagliflozin 10 mg tablet  Commonly known as: JARDIANCE  10 mg.     ferrous sulfate 325 mg (65 mg iron) Tab tablet  Commonly known as: FEOSOL  Take 325 mg by mouth daily with breakfast.     finasteride 5 mg tablet  Commonly known as: PROSCAR  Take 5 mg by mouth.     furosemide 20 MG tablet  Commonly known as: LASIX  Take 20 mg by mouth.     HYDROcodone-acetaminophen  mg per tablet  Commonly known as: NORCO  Take 1 tablet by mouth every 6 (six) hours as needed for Pain.     losartan 25 MG tablet  Commonly known as: COZAAR  Take 1 tablet by mouth every evening.     meloxicam 15 MG tablet  Commonly known as: MOBIC  Take 15 mg by mouth.     metoprolol succinate 50 MG 24 hr tablet  Commonly known as: TOPROL-XL  25 mg.     NIFEdipine 90 MG Tbsr  Commonly known as: ADALAT CC  Take 90 mg by mouth.     * potassium chloride 10 MEQ Tbsr  Commonly known as: KLOR-CON  10 mEq.     * potassium chloride 10 MEQ Cpsr  Commonly known as: MICRO-K  Take 10 mEq by mouth.     * rosuvastatin 20 MG tablet  Commonly known as: CRESTOR  Take 10 mg by mouth.     * rosuvastatin 10 MG tablet  Commonly known as: CRESTOR  rosuvastatin 10 mg tablet   TAKE 1 TABLET BY MOUTH EACH NIGHT AT BEDTIME     * sildenafiL 100 MG tablet  Commonly known as: VIAGRA  Take 50 mg by mouth.     * sildenafiL 100 MG tablet  Commonly known as: VIAGRA  sildenafil 100 mg tablet   Take 1  tablet every day by oral route as directed.     spironolactone 25 MG tablet  Commonly known as: ALDACTONE  Take 1 tablet by mouth every evening.         * This list has 8 medication(s) that are the same as other medications prescribed for you. Read the directions carefully, and ask your doctor or other care provider to review them with you.                Rex Joe MD  Orthopedics  [facility] - Short Stay Unit

## 2023-04-07 NOTE — SUBJECTIVE & OBJECTIVE
Past Medical History:   Diagnosis Date    Acute pain of left knee     Atrial flutter     Diabetes mellitus     Hyperlipidemia     Hypertension        Past Surgical History:   Procedure Laterality Date    HERNIA REPAIR      X2    JOINT REPLACEMENT Right     knee    SHOULDER SURGERY Left        Review of patient's allergies indicates:   Allergen Reactions    Ace inhibitors Anaphylaxis     Other reaction(s): SWELLING-THROAT, SWELLING-LIPS       No current facility-administered medications on file prior to encounter.     Current Outpatient Medications on File Prior to Encounter   Medication Sig    aspirin (ECOTRIN) 81 MG EC tablet Take 81 mg by mouth once daily.    bumetanide (BUMEX) 1 MG tablet 1 mg.    cholecalciferol, vitamin D3, (VITAMIN D3) 10 mcg/mL (400 unit/mL) Drop cholecalciferol (vit D3) 10 mcg/mL(400 unit/mL) oral syringe(ORAL USE)   Take by oral route as directed.    cholecalciferol, vitamin D3, 100 mcg (4,000 unit) Tab Take 10 mcg by mouth.    cyanocobalamin (VITAMIN B-12) 1000 MCG tablet Take 1,000 mcg by mouth.    empagliflozin (JARDIANCE) 10 mg tablet 10 mg.    losartan (COZAAR) 25 MG tablet Take 1 tablet by mouth every evening.    metoprolol succinate (TOPROL-XL) 50 MG 24 hr tablet 25 mg.    potassium chloride (MICRO-K) 10 MEQ CpSR Take 10 mEq by mouth.    rosuvastatin (CRESTOR) 20 MG tablet Take 10 mg by mouth.    spironolactone (ALDACTONE) 25 MG tablet Take 1 tablet by mouth every evening.    apixaban (ELIQUIS) 5 mg Tab 5 mg.    ferrous sulfate (FEOSOL) 325 mg (65 mg iron) Tab tablet Take 325 mg by mouth daily with breakfast.    finasteride (PROSCAR) 5 mg tablet Take 5 mg by mouth.    furosemide (LASIX) 20 MG tablet Take 20 mg by mouth.    HYDROcodone-acetaminophen (NORCO)  mg per tablet Take 1 tablet by mouth every 6 (six) hours as needed for Pain.    meloxicam (MOBIC) 15 MG tablet Take 15 mg by mouth.    NIFEdipine (ADALAT CC) 90 MG TbSR Take 90 mg by mouth.    potassium chloride (KLOR-CON)  10 MEQ TbSR 10 mEq.    rosuvastatin (CRESTOR) 10 MG tablet rosuvastatin 10 mg tablet   TAKE 1 TABLET BY MOUTH EACH NIGHT AT BEDTIME    sildenafiL (VIAGRA) 100 MG tablet Take 50 mg by mouth.    sildenafiL (VIAGRA) 100 MG tablet sildenafil 100 mg tablet   Take 1 tablet every day by oral route as directed.     Family History       Problem Relation (Age of Onset)    Depression Maternal Grandmother    Hypertension Maternal Grandmother          Tobacco Use    Smoking status: Never    Smokeless tobacco: Never   Substance and Sexual Activity    Alcohol use: Yes     Comment: socially    Drug use: Never    Sexual activity: Yes     Review of Systems   Constitutional:  Positive for diaphoresis.   Respiratory:  Negative for chest tightness and shortness of breath.    Cardiovascular:  Negative for chest pain and palpitations.   Gastrointestinal:  Negative for nausea and vomiting.   Skin:  Negative for color change and rash.   Neurological:  Positive for dizziness, weakness and light-headedness. Negative for syncope and headaches.   Psychiatric/Behavioral:  Negative for confusion and decreased concentration.    Objective:     Vital Signs (Most Recent):  Temp: 97.9 °F (36.6 °C) (04/07/23 0728)  Pulse: 64 (04/07/23 0728)  Resp: 18 (04/07/23 1451)  BP: 125/73 (04/07/23 0728)  SpO2: 95 % (04/07/23 0728) Vital Signs (24h Range):  Temp:  [97.9 °F (36.6 °C)-98.6 °F (37 °C)] 97.9 °F (36.6 °C)  Pulse:  [64-82] 64  Resp:  [14-20] 18  SpO2:  [92 %-100 %] 95 %  BP: (118-155)/(66-81) 125/73     Weight: 110.1 kg (242 lb 11.6 oz)  Body mass index is 32.02 kg/m².    Physical Exam  Vitals and nursing note reviewed.   Constitutional:       General: He is not in acute distress.     Appearance: Normal appearance. He is not ill-appearing or toxic-appearing.   HENT:      Head: Normocephalic and atraumatic.      Nose: Nose normal.      Mouth/Throat:      Mouth: Mucous membranes are moist.   Eyes:      Conjunctiva/sclera: Conjunctivae normal.       Pupils: Pupils are equal, round, and reactive to light.   Cardiovascular:      Rate and Rhythm: Normal rate and regular rhythm.      Pulses: Normal pulses.   Pulmonary:      Effort: Pulmonary effort is normal. No respiratory distress.      Breath sounds: Normal breath sounds.   Musculoskeletal:      Cervical back: No rigidity.   Skin:     General: Skin is warm and dry.      Coloration: Skin is not jaundiced or pale.      Findings: No rash.   Neurological:      General: No focal deficit present.      Mental Status: He is alert and oriented to person, place, and time.      Cranial Nerves: No cranial nerve deficit.   Psychiatric:         Behavior: Behavior normal.         Thought Content: Thought content normal.         CRANIAL NERVES     CN III, IV, VI   Pupils are equal, round, and reactive to light.     Significant Labs: All pertinent labs within the past 24 hours have been reviewed.    Significant Imaging: I have reviewed all pertinent imaging results/findings within the past 24 hours.

## 2023-04-07 NOTE — PLAN OF CARE
Problem: Physical Therapy  Goal: Physical Therapy Goal  Description: Short Term Goals  Independent with HEP  Independent with walkerx 100 feet FWB/WBAT: left lower extremity  0-100 knee flexion to allow normal sit to stand    Long term goals  Needed equipment for home.       Outcome: Ongoing, Progressing

## 2023-04-07 NOTE — CONSULTS
[facility] Short Stay [facility] Medicine  Consult Note    Patient Name: Rex Hassan  MRN: [id number]  Admission Date: 4/6/2023  Hospital Length of Stay: 0 days  Attending Physician: Rex Joe MD   Primary Care Provider: Rex Joe MD           Patient information was obtained from patient and past medical records.     Consults  Subjective:     Principal Problem: Primary osteoarthritis of both knees    Chief Complaint: No chief complaint on file.       HPI: Rex Hassan is a 62-year-old male with history of hypertension, diabetes, and atrial flutter. He is status post left total knee arthoplasty by Dr. Joe on 04/06/23. Patient tolerated procedure well with no immediate postoperative complications. Medicine consulted for evaluation of orthostatic hypotension and syncope.     Patient states initial episodes occurred while attempting to stand and ambulate with physical therapy. Fluid bolus was given with improvement in blood pressure. Most recent occurred after he stood up from tying his shoes while getting dressed for discharge. He became dizzy and lightheaded and his knees buckled but he did not lose consciousness. He denies any chest pain or palpitations during the episode, states he has never experienced anything similar prior to this hospitalization. His arrhythmia is generally well controlled with medications. Orthostatic vitals recorded shortly after episode, patient did experience positive decrease in SBP and DBP without compensatory tachycardia. He also experience vasovagal symptoms during the test.       Past Medical History:   Diagnosis Date    Acute pain of left knee     Atrial flutter     Diabetes mellitus     Hyperlipidemia     Hypertension        Past Surgical History:   Procedure Laterality Date    HERNIA REPAIR      X2    JOINT REPLACEMENT Right     knee    SHOULDER SURGERY Left        Review of patient's allergies indicates:   Allergen Reactions    Ace inhibitors  Anaphylaxis     Other reaction(s): SWELLING-THROAT, SWELLING-LIPS       No current facility-administered medications on file prior to encounter.     Current Outpatient Medications on File Prior to Encounter   Medication Sig    aspirin (ECOTRIN) 81 MG EC tablet Take 81 mg by mouth once daily.    bumetanide (BUMEX) 1 MG tablet 1 mg.    cholecalciferol, vitamin D3, (VITAMIN D3) 10 mcg/mL (400 unit/mL) Drop cholecalciferol (vit D3) 10 mcg/mL(400 unit/mL) oral syringe(ORAL USE)   Take by oral route as directed.    cholecalciferol, vitamin D3, 100 mcg (4,000 unit) Tab Take 10 mcg by mouth.    cyanocobalamin (VITAMIN B-12) 1000 MCG tablet Take 1,000 mcg by mouth.    empagliflozin (JARDIANCE) 10 mg tablet 10 mg.    losartan (COZAAR) 25 MG tablet Take 1 tablet by mouth every evening.    metoprolol succinate (TOPROL-XL) 50 MG 24 hr tablet 25 mg.    potassium chloride (MICRO-K) 10 MEQ CpSR Take 10 mEq by mouth.    rosuvastatin (CRESTOR) 20 MG tablet Take 10 mg by mouth.    spironolactone (ALDACTONE) 25 MG tablet Take 1 tablet by mouth every evening.    apixaban (ELIQUIS) 5 mg Tab 5 mg.    ferrous sulfate (FEOSOL) 325 mg (65 mg iron) Tab tablet Take 325 mg by mouth daily with breakfast.    finasteride (PROSCAR) 5 mg tablet Take 5 mg by mouth.    furosemide (LASIX) 20 MG tablet Take 20 mg by mouth.    HYDROcodone-acetaminophen (NORCO)  mg per tablet Take 1 tablet by mouth every 6 (six) hours as needed for Pain.    meloxicam (MOBIC) 15 MG tablet Take 15 mg by mouth.    NIFEdipine (ADALAT CC) 90 MG TbSR Take 90 mg by mouth.    potassium chloride (KLOR-CON) 10 MEQ TbSR 10 mEq.    rosuvastatin (CRESTOR) 10 MG tablet rosuvastatin 10 mg tablet   TAKE 1 TABLET BY MOUTH EACH NIGHT AT BEDTIME    sildenafiL (VIAGRA) 100 MG tablet Take 50 mg by mouth.    sildenafiL (VIAGRA) 100 MG tablet sildenafil 100 mg tablet   Take 1 tablet every day by oral route as directed.     Family History       Problem Relation (Age  of Onset)    Depression Maternal Grandmother    Hypertension Maternal Grandmother          Tobacco Use    Smoking status: Never    Smokeless tobacco: Never   Substance and Sexual Activity    Alcohol use: Yes     Comment: socially    Drug use: Never    Sexual activity: Yes     Review of Systems   Constitutional:  Positive for diaphoresis.   Respiratory:  Negative for chest tightness and shortness of breath.    Cardiovascular:  Negative for chest pain and palpitations.   Gastrointestinal:  Negative for nausea and vomiting.   Skin:  Negative for color change and rash.   Neurological:  Positive for dizziness, weakness and light-headedness. Negative for syncope and headaches.   Psychiatric/Behavioral:  Negative for confusion and decreased concentration.    Objective:     Vital Signs (Most Recent):  Temp: 97.9 °F (36.6 °C) (04/07/23 0728)  Pulse: 64 (04/07/23 0728)  Resp: 18 (04/07/23 1451)  BP: 125/73 (04/07/23 0728)  SpO2: 95 % (04/07/23 0728) Vital Signs (24h Range):  Temp:  [97.9 °F (36.6 °C)-98.6 °F (37 °C)] 97.9 °F (36.6 °C)  Pulse:  [64-82] 64  Resp:  [14-20] 18  SpO2:  [92 %-100 %] 95 %  BP: (118-155)/(66-81) 125/73     Weight: 110.1 kg (242 lb 11.6 oz)  Body mass index is 32.02 kg/m².    Physical Exam  Vitals and nursing note reviewed.   Constitutional:       General: He is not in acute distress.     Appearance: Normal appearance. He is not ill-appearing or toxic-appearing.   HENT:      Head: Normocephalic and atraumatic.      Nose: Nose normal.      Mouth/Throat:      Mouth: Mucous membranes are moist.   Eyes:      Conjunctiva/sclera: Conjunctivae normal.      Pupils: Pupils are equal, round, and reactive to light.   Cardiovascular:      Rate and Rhythm: Normal rate and regular rhythm.      Pulses: Normal pulses.   Pulmonary:      Effort: Pulmonary effort is normal. No respiratory distress.      Breath sounds: Normal breath sounds.   Musculoskeletal:      Cervical back: No rigidity.   Skin:     General:  Skin is warm and dry.      Coloration: Skin is not jaundiced or pale.      Findings: No rash.   Neurological:      General: No focal deficit present.      Mental Status: He is alert and oriented to person, place, and time.      Cranial Nerves: No cranial nerve deficit.   Psychiatric:         Behavior: Behavior normal.         Thought Content: Thought content normal.         CRANIAL NERVES     CN III, IV, VI   Pupils are equal, round, and reactive to light.     Significant Labs: All pertinent labs within the past 24 hours have been reviewed.    Significant Imaging: I have reviewed all pertinent imaging results/findings within the past 24 hours.    Assessment/Plan:     * Primary osteoarthritis of both knees  Status post left total knee arthroplasty by Dr. Joe. Continue pain management, PT/OT.        Postural dizziness with presyncope  Orthostatic hypotension with syncope during ambulation with PT, improvement in blood pressure following fluids bolus. Patient was preparing for discharge when he experienced another episode of light-headedness and near-syncope but did not lose consciousness. No other neurologic or cardiac symptoms.     Orthostatic vitals with positive decrease in SBP and DBP without compensatory tachycardia. Patient did experience vagal symptoms during test. Blood pressure WNL while at rest.    Will place on telemetry, continue IV fluids and monitor.     Essential hypertension  Orthostatic today. Patient on multiple medication including diuretic and beta blocker. Continue to monitor vitals and adjust home medications as indicated.         VTE Risk Mitigation (From admission, onward)         Ordered     IP VTE HIGH RISK PATIENT  Once         04/07/23 0839     Place MELANIE hose  Until discontinued         04/07/23 0839     apixaban tablet 5 mg  2 times daily         04/06/23 1515     Place MELANIE hose  Until discontinued         04/06/23 1920                    Thank you for your consult. I will follow-up  with patient. Please contact us if you have any additional questions.    Brisa Florence DO  Department of Hospital Medicine   [facility] Stay [facility]

## 2023-04-07 NOTE — NURSING
3323 Transfer of care from Erendira Alvarez LPN to myself. Pt laying in bed with HOB elevated alert and oriented x4. No signs of distress noted. Family at bedside. Care ongoing.

## 2023-04-07 NOTE — ASSESSMENT & PLAN NOTE
Orthostatic today. Patient on multiple medication including diuretic and beta blocker. Continue to monitor vitals and adjust home medications as indicated.

## 2023-04-08 VITALS
HEART RATE: 96 BPM | RESPIRATION RATE: 19 BRPM | SYSTOLIC BLOOD PRESSURE: 120 MMHG | HEIGHT: 73 IN | WEIGHT: 242.75 LBS | OXYGEN SATURATION: 97 % | TEMPERATURE: 99 F | BODY MASS INDEX: 32.17 KG/M2 | DIASTOLIC BLOOD PRESSURE: 63 MMHG

## 2023-04-08 PROBLEM — R42 POSTURAL DIZZINESS WITH PRESYNCOPE: Status: RESOLVED | Noted: 2023-04-07 | Resolved: 2023-04-08

## 2023-04-08 PROBLEM — R55 POSTURAL DIZZINESS WITH PRESYNCOPE: Status: RESOLVED | Noted: 2023-04-07 | Resolved: 2023-04-08

## 2023-04-08 LAB
ALBUMIN SERPL BCP-MCNC: 3.3 G/DL (ref 3.5–5)
ALBUMIN/GLOB SERPL: 1 {RATIO}
ALP SERPL-CCNC: 39 U/L (ref 45–115)
ALT SERPL W P-5'-P-CCNC: 22 U/L (ref 16–61)
ANION GAP SERPL CALCULATED.3IONS-SCNC: 13 MMOL/L (ref 7–16)
ANISOCYTOSIS BLD QL SMEAR: ABNORMAL
AST SERPL W P-5'-P-CCNC: 15 U/L (ref 15–37)
BASOPHILS # BLD AUTO: 0 K/UL (ref 0–0.2)
BASOPHILS NFR BLD AUTO: 0 % (ref 0–1)
BILIRUB SERPL-MCNC: 0.6 MG/DL (ref ?–1.2)
BUN SERPL-MCNC: 20 MG/DL (ref 7–18)
BUN/CREAT SERPL: 16 (ref 6–20)
CALCIUM SERPL-MCNC: 8.7 MG/DL (ref 8.5–10.1)
CHLORIDE SERPL-SCNC: 105 MMOL/L (ref 98–107)
CO2 SERPL-SCNC: 29 MMOL/L (ref 21–32)
CREAT SERPL-MCNC: 1.29 MG/DL (ref 0.7–1.3)
DIFFERENTIAL METHOD BLD: ABNORMAL
EGFR (NO RACE VARIABLE) (RUSH/TITUS): 62 ML/MIN/1.73M²
EOSINOPHIL # BLD AUTO: 0 K/UL (ref 0–0.5)
EOSINOPHIL NFR BLD AUTO: 0 % (ref 1–4)
ERYTHROCYTE [DISTWIDTH] IN BLOOD BY AUTOMATED COUNT: 14.8 % (ref 11.5–14.5)
GLOBULIN SER-MCNC: 3.4 G/DL (ref 2–4)
GLUCOSE SERPL-MCNC: 118 MG/DL (ref 74–106)
HCT VFR BLD AUTO: 33.2 % (ref 40–54)
HGB BLD-MCNC: 10.7 G/DL (ref 13.5–18)
IMM GRANULOCYTES # BLD AUTO: 0.02 K/UL (ref 0–0.04)
IMM GRANULOCYTES NFR BLD: 0.3 % (ref 0–0.4)
LYMPHOCYTES # BLD AUTO: 1.37 K/UL (ref 1–4.8)
LYMPHOCYTES NFR BLD AUTO: 22.1 % (ref 27–41)
MCH RBC QN AUTO: 27.8 PG (ref 27–31)
MCHC RBC AUTO-ENTMCNC: 32.2 G/DL (ref 32–36)
MCV RBC AUTO: 86.2 FL (ref 80–96)
MONOCYTES # BLD AUTO: 0.7 K/UL (ref 0–0.8)
MONOCYTES NFR BLD AUTO: 11.3 % (ref 2–6)
MPC BLD CALC-MCNC: ABNORMAL G/DL
NEUTROPHILS # BLD AUTO: 4.1 K/UL (ref 1.8–7.7)
NEUTROPHILS NFR BLD AUTO: 66.3 % (ref 53–65)
NRBC # BLD AUTO: 0 X10E3/UL
NRBC, AUTO (.00): 0 %
OVALOCYTES BLD QL SMEAR: ABNORMAL
PLATELET # BLD AUTO: 118 K/UL (ref 150–400)
PLATELET MORPHOLOGY: ABNORMAL
POTASSIUM SERPL-SCNC: 3.9 MMOL/L (ref 3.5–5.1)
PROT SERPL-MCNC: 6.7 G/DL (ref 6.4–8.2)
RBC # BLD AUTO: 3.85 M/UL (ref 4.6–6.2)
SODIUM SERPL-SCNC: 143 MMOL/L (ref 136–145)
WBC # BLD AUTO: 6.19 K/UL (ref 4.5–11)

## 2023-04-08 PROCEDURE — 99232 PR SUBSEQUENT HOSPITAL CARE,LEVL II: ICD-10-PCS | Mod: ,,, | Performed by: FAMILY MEDICINE

## 2023-04-08 PROCEDURE — 25000003 PHARM REV CODE 250: Performed by: ORTHOPAEDIC SURGERY

## 2023-04-08 PROCEDURE — 97110 THERAPEUTIC EXERCISES: CPT

## 2023-04-08 PROCEDURE — 97116 GAIT TRAINING THERAPY: CPT

## 2023-04-08 PROCEDURE — 99232 SBSQ HOSP IP/OBS MODERATE 35: CPT | Mod: ,,, | Performed by: FAMILY MEDICINE

## 2023-04-08 PROCEDURE — 80053 COMPREHEN METABOLIC PANEL: CPT | Performed by: ORTHOPAEDIC SURGERY

## 2023-04-08 PROCEDURE — 85025 COMPLETE CBC W/AUTO DIFF WBC: CPT | Performed by: ORTHOPAEDIC SURGERY

## 2023-04-08 RX ADMIN — HYDROCODONE BITARTRATE AND ACETAMINOPHEN 1 TABLET: 5; 325 TABLET ORAL at 06:04

## 2023-04-08 RX ADMIN — APIXABAN 5 MG: 5 TABLET, FILM COATED ORAL at 08:04

## 2023-04-08 RX ADMIN — DOCUSATE SODIUM 100 MG: 100 CAPSULE, LIQUID FILLED ORAL at 08:04

## 2023-04-08 RX ADMIN — ATORVASTATIN CALCIUM 10 MG: 10 TABLET, FILM COATED ORAL at 08:04

## 2023-04-08 RX ADMIN — METOPROLOL SUCCINATE 25 MG: 25 TABLET, EXTENDED RELEASE ORAL at 08:04

## 2023-04-08 RX ADMIN — HYDROCODONE BITARTRATE AND ACETAMINOPHEN 1 TABLET: 5; 325 TABLET ORAL at 01:04

## 2023-04-08 RX ADMIN — ASPIRIN 81 MG: 81 TABLET, COATED ORAL at 08:04

## 2023-04-08 RX ADMIN — BUMETANIDE 1 MG: 1 TABLET ORAL at 08:04

## 2023-04-08 NOTE — NURSING
Pt has other shahab hose with instructions for putting on after dressing change, incentive spir. . Dc teachings done to pt and wife, both verb understanding.

## 2023-04-08 NOTE — NURSING
Pt home health is being set up, pt to call on Monday for an appt in 3 weeks, dressing changes sent home with pt, PT for [facility] out[facility] set up, Pt does not have a drain, urinal sent home, ice packs, immobizer, home meds called in for pt.

## 2023-04-08 NOTE — HOSPITAL COURSE
4/8: Reported fever yesterday (Tmax 102.1) with continued low-grade elevation. Potentially stress response from surgery however discharge currently pending further workup. Anticipate discharge later today or first thing in AM.

## 2023-04-08 NOTE — CARE UPDATE
Patient with low blood pressure yesterday.  Being worked up by Medicine.  Once stabilized on his blood pressure.  Patient can be discharged home.  Follow-up with Dr. Joe.  His drain is out.  Home health to change dressing.

## 2023-04-08 NOTE — SUBJECTIVE & OBJECTIVE
Review of Systems   Constitutional:  Negative for diaphoresis.   Respiratory:  Negative for chest tightness and shortness of breath.    Cardiovascular:  Negative for chest pain and palpitations.   Gastrointestinal:  Negative for nausea and vomiting.   Skin:  Negative for color change and rash.   Neurological:  Negative for dizziness, syncope, weakness, light-headedness and headaches.   Psychiatric/Behavioral:  Negative for confusion and decreased concentration.    Objective:     Vital Signs (Most Recent):  Temp: 99 °F (37.2 °C) (04/08/23 1215)  Pulse: 89 (04/08/23 1215)  Resp: 18 (04/08/23 1338)  BP: (!) 142/75 (04/08/23 1215)  SpO2: 96 % (04/08/23 1215)   Vital Signs (24h Range):  Temp:  [99 °F (37.2 °C)-100.1 °F (37.8 °C)] 99 °F (37.2 °C)  Pulse:  [89-94] 89  Resp:  [16-18] 18  SpO2:  [94 %-97 %] 96 %  BP: (116-162)/(63-93) 142/75     Weight: 110.1 kg (242 lb 11.6 oz)  Body mass index is 32.02 kg/m².    Intake/Output Summary (Last 24 hours) at 4/8/2023 1559  Last data filed at 4/7/2023 1934  Gross per 24 hour   Intake --   Output 200 ml   Net -200 ml      Physical Exam  Vitals and nursing note reviewed.   Constitutional:       General: He is not in acute distress.     Appearance: Normal appearance. He is not ill-appearing or toxic-appearing.   HENT:      Head: Normocephalic and atraumatic.      Nose: Nose normal.      Mouth/Throat:      Mouth: Mucous membranes are moist.   Eyes:      Conjunctiva/sclera: Conjunctivae normal.      Pupils: Pupils are equal, round, and reactive to light.   Cardiovascular:      Rate and Rhythm: Normal rate and regular rhythm.      Pulses: Normal pulses.   Pulmonary:      Effort: Pulmonary effort is normal. No respiratory distress.      Breath sounds: Normal breath sounds.   Musculoskeletal:      Cervical back: No rigidity.   Skin:     General: Skin is warm and dry.      Coloration: Skin is not jaundiced or pale.      Findings: No rash.   Neurological:      General: No focal deficit  present.      Mental Status: He is alert and oriented to person, place, and time.      Cranial Nerves: No cranial nerve deficit.   Psychiatric:         Behavior: Behavior normal.         Thought Content: Thought content normal.       Significant Labs: All pertinent labs within the past 24 hours have been reviewed.    Significant Imaging: I have reviewed all pertinent imaging results/findings within the past 24 hours.

## 2023-04-08 NOTE — PLAN OF CARE
Problem: Adult Inpatient Plan of Care  Goal: Plan of Care Review  Outcome: Ongoing, Progressing  Flowsheets (Taken 4/8/2023 1517)  Plan of Care Reviewed With:   patient   spouse  Goal: Patient-Specific Goal (Individualized)  Outcome: Ongoing, Progressing  Flowsheets (Taken 4/8/2023 1517)  Anxieties, Fears or Concerns: none noted  Goal: Absence of Hospital-Acquired Illness or Injury  Outcome: Ongoing, Progressing  Intervention: Identify and Manage Fall Risk  Flowsheets (Taken 4/8/2023 1517)  Safety Promotion/Fall Prevention:   assistive device/personal item within reach   Fall Risk reviewed with patient/family   instructed to call staff for mobility   side rails raised x 2  Intervention: Prevent Skin Injury  Flowsheets (Taken 4/8/2023 1517)  Body Position:   heels elevated   position changed independently  Intervention: Prevent and Manage VTE (Venous Thromboembolism) Risk  Flowsheets (Taken 4/8/2023 1517)  Activity Management:   Ambulated to bathroom - L4   Ambulated in room - L4   Ambulated in rea - L4  VTE Prevention/Management: remove, assess skin, and reapply sequential compression device  Intervention: Prevent Infection  Flowsheets (Taken 4/8/2023 1517)  Infection Prevention: hand hygiene promoted  Goal: Optimal Comfort and Wellbeing  Outcome: Ongoing, Progressing  Intervention: Monitor Pain and Promote Comfort  Flowsheets (Taken 4/8/2023 1517)  Pain Management Interventions:   pain management plan reviewed with patient/caregiver   position adjusted   pillow support provided   relaxation techniques promoted   quiet environment facilitated  Intervention: Provide Person-Centered Care  Flowsheets (Taken 4/8/2023 1517)  Trust Relationship/Rapport: care explained  Goal: Readiness for Transition of Care  Outcome: Ongoing, Progressing  Intervention: Mutually Develop Transition Plan  Flowsheets (Taken 4/8/2023 1517)  Do you expect to return to your current living situation?: Yes  Do you have help at home or someone  to help you manage your care at home?: Yes  Readmission within 30 days?: No

## 2023-04-08 NOTE — PROGRESS NOTES
[facility] Medicine  Progress Note    Patient Name: Rex Hassan  MRN: [id number]  Patient Class: OP- Outpatient Recovery   Admission Date: 4/6/2023  Length of Stay: 0 days  Attending Physician: Rex Joe MD  Primary Care Provider: Rex Joe MD        Subjective:     Principal Problem:Primary osteoarthritis of both knees        HPI:  Rex Hassan is a 62-year-old male with history of hypertension, diabetes, and atrial flutter. He is status post left total knee arthoplasty by Dr. Joe on 04/06/23. Patient tolerated procedure well with no immediate postoperative complications. Medicine consulted for evaluation of orthostatic hypotension and syncope.     Patient states initial episodes occurred while attempting to stand and ambulate with physical therapy. Fluid bolus was given with improvement in blood pressure. Most recent occurred after he stood up from tying his shoes while getting dressed for discharge. He became dizzy and lightheaded and his knees buckled but he did not lose consciousness. He denies any chest pain or palpitations during the episode, states he has never experienced anything similar prior to this hospitalization. His arrhythmia is generally well controlled with medications. Orthostatic vitals recorded shortly after episode, patient did experience positive decrease in SBP and DBP without compensatory tachycardia. He also experience vasovagal symptoms during the test.       Overview/Hospital Course:  4/8: Reported fever yesterday (Tmax 102.1) with continued low-grade elevation. Potentially stress response from surgery however discharge currently pending further workup. Anticipate discharge later today or first thing in AM.         Review of Systems   Constitutional:  Negative for diaphoresis.   Respiratory:  Negative for chest tightness and shortness of breath.    Cardiovascular:  Negative for chest pain and palpitations.   Gastrointestinal:  Negative  for nausea and vomiting.   Skin:  Negative for color change and rash.   Neurological:  Negative for dizziness, syncope, weakness, light-headedness and headaches.   Psychiatric/Behavioral:  Negative for confusion and decreased concentration.    Objective:     Vital Signs (Most Recent):  Temp: 99 °F (37.2 °C) (04/08/23 1215)  Pulse: 89 (04/08/23 1215)  Resp: 18 (04/08/23 1338)  BP: (!) 142/75 (04/08/23 1215)  SpO2: 96 % (04/08/23 1215)   Vital Signs (24h Range):  Temp:  [99 °F (37.2 °C)-100.1 °F (37.8 °C)] 99 °F (37.2 °C)  Pulse:  [89-94] 89  Resp:  [16-18] 18  SpO2:  [94 %-97 %] 96 %  BP: (116-162)/(63-93) 142/75     Weight: 110.1 kg (242 lb 11.6 oz)  Body mass index is 32.02 kg/m².    Intake/Output Summary (Last 24 hours) at 4/8/2023 1559  Last data filed at 4/7/2023 1934  Gross per 24 hour   Intake --   Output 200 ml   Net -200 ml      Physical Exam  Vitals and nursing note reviewed.   Constitutional:       General: He is not in acute distress.     Appearance: Normal appearance. He is not ill-appearing or toxic-appearing.   HENT:      Head: Normocephalic and atraumatic.      Nose: Nose normal.      Mouth/Throat:      Mouth: Mucous membranes are moist.   Eyes:      Conjunctiva/sclera: Conjunctivae normal.      Pupils: Pupils are equal, round, and reactive to light.   Cardiovascular:      Rate and Rhythm: Normal rate and regular rhythm.      Pulses: Normal pulses.   Pulmonary:      Effort: Pulmonary effort is normal. No respiratory distress.      Breath sounds: Normal breath sounds.   Musculoskeletal:      Cervical back: No rigidity.   Skin:     General: Skin is warm and dry.      Coloration: Skin is not jaundiced or pale.      Findings: No rash.   Neurological:      General: No focal deficit present.      Mental Status: He is alert and oriented to person, place, and time.      Cranial Nerves: No cranial nerve deficit.   Psychiatric:         Behavior: Behavior normal.         Thought Content: Thought content normal.        Significant Labs: All pertinent labs within the past 24 hours have been reviewed.    Significant Imaging: I have reviewed all pertinent imaging results/findings within the past 24 hours.      Assessment/Plan:      * Primary osteoarthritis of both knees  Status post left total knee arthroplasty by Dr. Joe. Continue pain management, PT/OT.        Postural dizziness with presyncope  Orthostatic hypotension with syncope during ambulation with PT, improvement in blood pressure following fluids bolus. Patient was preparing for discharge when he experienced another episode of light-headedness and near-syncope but did not lose consciousness. No other neurologic or cardiac symptoms.     Orthostatic vitals with positive decrease in SBP and DBP without compensatory tachycardia. Patient did experience vagal symptoms during test. Blood pressure WNL while at rest.    Will place on telemetry, continue IV fluids and monitor.     Essential hypertension  Orthostatic today. Patient on multiple medication including diuretic and beta blocker. Continue to monitor vitals and adjust home medications as indicated.         VTE Risk Mitigation (From admission, onward)         Ordered     IP VTE HIGH RISK PATIENT  Once         04/07/23 0839     Place MELANIE hose  Until discontinued         04/07/23 0839     apixaban tablet 5 mg  2 times daily         04/06/23 1515     Place MELANIE hose  Until discontinued         04/06/23 1920                Discharge Planning   OTILIO: 4/7/2023     Code Status: Prior   Is the patient medically ready for discharge?:     Reason for patient still in hospital (select all that apply): Treatment  Discharge Plan A: Other (home with outpatient therapy)                  Brisa Florence DO  Department of Hospital Medicine   [facility]

## 2023-04-08 NOTE — PLAN OF CARE
Problem: Adult Inpatient Plan of Care  Goal: Plan of Care Review  Outcome: Ongoing, Progressing  Flowsheets (Taken 4/8/2023 0241)  Plan of Care Reviewed With:   patient   spouse  Goal: Patient-Specific Goal (Individualized)  Outcome: Ongoing, Progressing  Flowsheets (Taken 4/8/2023 0241)  Anxieties, Fears or Concerns: pain control  Individualized Care Needs: pain control  Goal: Absence of Hospital-Acquired Illness or Injury  Outcome: Ongoing, Progressing  Intervention: Identify and Manage Fall Risk  Flowsheets (Taken 4/8/2023 0241)  Safety Promotion/Fall Prevention: assistive device/personal item within reach  Intervention: Prevent Skin Injury  Flowsheets (Taken 4/8/2023 0241)  Body Position: position changed independently  Skin Protection: adhesive use limited  Intervention: Prevent and Manage VTE (Venous Thromboembolism) Risk  Flowsheets (Taken 4/8/2023 0241)  Activity Management: Arm raise - L1  VTE Prevention/Management:   ambulation promoted   fluids promoted  Range of Motion: active ROM (range of motion) encouraged  Intervention: Prevent Infection  Flowsheets (Taken 4/8/2023 0241)  Infection Prevention: rest/sleep promoted  Goal: Optimal Comfort and Wellbeing  Outcome: Ongoing, Progressing  Intervention: Monitor Pain and Promote Comfort  Flowsheets (Taken 4/8/2023 0241)  Pain Management Interventions: care clustered  Intervention: Provide Person-Centered Care  Flowsheets (Taken 4/8/2023 0241)  Trust Relationship/Rapport: care explained  Goal: Readiness for Transition of Care  Outcome: Ongoing, Progressing     Problem: Infection  Goal: Absence of Infection Signs and Symptoms  Outcome: Ongoing, Progressing     Problem: Fall Injury Risk  Goal: Absence of Fall and Fall-Related Injury  Outcome: Ongoing, Progressing     Problem: Skin Injury Risk Increased  Goal: Skin Health and Integrity  Outcome: Ongoing, Progressing     Problem: Gas Exchange Impaired  Goal: Optimal Gas Exchange  Outcome: Ongoing, Progressing

## 2023-04-08 NOTE — PT/OT/SLP PROGRESS
"Physical Therapy Treatment    Patient Name:  Rex Hassan   MRN:  [id number]    Recommendations:     Discharge Recommendations: home with home health, home health PT, outpatient PT  Discharge Equipment Recommendations: none  Barriers to discharge: None    Assessment:     Rex Hassan is a 62 y.o. male admitted with a medical diagnosis of Primary osteoarthritis of both knees.  He presents with the following impairments/functional limitations: decreased ROM, gait instability, impaired endurance, pain .    Patient BP remained stable throughout PT treatment with no syncope or lightheadedness. Safe mobility using RW. Patient and spouse report confidence with d/c home later today.    Rehab Prognosis: Good; patient would benefit from acute skilled PT services to address these deficits and reach maximum level of function.    Recent Surgery: Procedure(s) (LRB):  ARTHROPLASTY, KNEE, TOTAL, USING COMPUTER-ASSISTED NAVIGATION (Left) 2 Days Post-Op    Plan:     During this hospitalization, patient to be seen daily to address the identified rehab impairments via gait training, therapeutic activities, therapeutic exercises and progress toward the following goals:    Plan of Care Expires:  05/08/23    Subjective     Chief Complaint: L TKR  Patient/Family Comments/goals: "I feel a lot better."  Pain/Comfort:  Pain Rating 1: 3/10  Location - Side 1: Left  Location 1: knee  Pain Addressed 1: Pre-medicate for activity, Reposition, Distraction, Cessation of Activity  Pain Rating Post-Intervention 1: 3/10      Objective:     Communicated with Shira Turcios RN prior to session.  Patient found supine with peripheral IV, telemetry upon PT entry to room.     General Precautions: Standard, fall  Orthopedic Precautions: LLE weight bearing as tolerated  Braces: N/A  Respiratory Status: Room air     Functional Mobility:  Bed Mobility:     Supine to Sit: minimum assistance and verbal cues; assist for LE  Transfers:     Sit to Stand:  contact " guard assistance with rolling walker  Bed to Chair: contact guard assistance with  rolling walker  using  Step Transfer  Gait: 100' using RW CGA to SBA; occasional verbal cues for sequencing. No lightheadedness. Step to pattern, slow patrick, short step lengths, downward gaze      AM-PAC 6 CLICK MOBILITY  Turning over in bed (including adjusting bedclothes, sheets and blankets)?: 3  Sitting down on and standing up from a chair with arms (e.g., wheelchair, bedside commode, etc.): 4  Moving from lying on back to sitting on the side of the bed?: 3  Moving to and from a bed to a chair (including a wheelchair)?: 4  Need to walk in hospital room?: 4  Climbing 3-5 steps with a railing?: 3  Basic Mobility Total Score: 21       Treatment & Education:  Left lower extremity exercise x 30 reps: ankle pumps, Quad sets, glut sets, long arc quads, heel slides, hip abduction/adduction, and straight leg raises with active assist ROM, verbal cues, and tactile cues     Seated knee flexion stretch with prolonged hold    Left knee ROM: 3-97  BP supine: 131/76; sitting 148/85; after gait 125/81      Gait per above     Patient left up in chair with all lines intact, call button in reach, Shira Turcios RN notified, and spouse present..    GOALS:   Multidisciplinary Problems       Physical Therapy Goals          Problem: Physical Therapy    Goal Priority Disciplines Outcome Goal Variances Interventions   Physical Therapy Goal     PT, PT/OT Ongoing, Progressing     Description: Short Term Goals  Independent with HEP  Independent with walkerx 100 feet FWB/WBAT: left lower extremity  0-100 knee flexion to allow normal sit to stand    Long term goals  Needed equipment for home.                            Time Tracking:     PT Received On: 04/08/23  PT Start Time: 0819     PT Stop Time: 0852  PT Total Time (min): 33 min     Billable Minutes: Gait Training 15 minutes and Therapeutic Exercise 15 minutes    Treatment Type: Treatment  PT/PTA:  PT     Number of PTA visits since last PT visit: 0     04/08/2023

## 2023-04-08 NOTE — PT/OT/SLP EVAL
Occupational Therapy Screen    OT consult was received on Thursday post op, but pt's legs were to weak and numb from spinal block to safely attempt getting up. OT was off Friday for Good Friday. OT checked on pt today and he had already gotten dressed and ambulated and was awaiting D/C. Pt plans to D/C today with spouse to home  and has no concerns about returning home. Pt did not have any orthostatic issues today. Pt has no concerns about D/C to home. This is his 2nd TKR, the fist was done less than a year ago. OT screen only as pt had no trouble with ambulation with PT today and was already dressed and awaiting D/C.

## 2023-04-09 NOTE — PLAN OF CARE
Rec'd call on yesterday at 5:26 pm from nurse stating that pt wants HH and will be discharging. Choice obtained for [facility]. Referral faxed over this AM. Spoke with pt via telephone. He already has rolling walker and BSC. Brenda with [facility] notified.

## 2023-04-10 ENCOUNTER — TELEPHONE (OUTPATIENT)
Dept: ORTHOPEDICS | Facility: HOSPITAL | Age: 63
End: 2023-04-10
Payer: OTHER GOVERNMENT

## 2023-04-10 NOTE — PLAN OF CARE
[facility] - Short Stay Unit  Discharge Final Note    Primary Care Provider: Rex Joe MD    Expected Discharge Date: 4/7/2023    Final Discharge Note (most recent)       Final Note - 04/10/23 0837          Final Note    Assessment Type Final Discharge Note     Anticipated Discharge Disposition Home-Health Care [place]     What phone number can be called within the next 1-3 days to see how you are doing after discharge? [phone]        Post-Acute Status    Post-Acute Authorization Home Health     Home Health Status Set-up Complete/Auth obtained     Patient choice form signed by patient/caregiver List with quality metrics by geographic area provided;List from System Post-Acute Care     Discharge Delays None known at this time                     Important Message from Medicare              Follow-up providers       Rex Joe MD   Specialty: Orthopedic Surgery   Relationship: PCP - General    [address]   [address]  Rex Joe Md, Orthopedic & Sports Medicine, [place] [address]   Phone: [phone]       Next Steps: Follow up in 3 week(s)    Instructions: Pt to call on monday for an appt in 3 weeks with Dr. Joe.    [facility]    [phone] office       Next Steps: Go on 4/7/2023    Instructions: Appt:4/10/2023 at 12:00 noon              After-discharge care                Home Medical Care       [facility]   Service: Home Rehabilitation    [address] [facility] [place] [address]   Phone: [phone]                             Pt [place] home with [facility]. Pt had bsc and rw pta. No further needs at [place].

## 2023-04-10 NOTE — TELEPHONE ENCOUNTER
Is your pain tolerable? yes      Has Home health therapy/outpatient therapy come to see you? no      Are you taking your ecotrin/lovenox/eliquis? eliquis      Have you had a bowel movement since surgery? yes      Are you wearing your MELANIE hose? yes      Are you doing ankle pumps?yes      Are you doing your incentive spirometry?yes      Any complaints/concerns?  none

## 2023-04-12 ENCOUNTER — TELEPHONE (OUTPATIENT)
Dept: ORTHOPEDICS | Facility: HOSPITAL | Age: 63
End: 2023-04-12
Payer: OTHER GOVERNMENT

## 2023-04-12 NOTE — TELEPHONE ENCOUNTER
Is your pain tolerable? yes      Has Home health therapy/outpatient therapy come to see you? yes      Are you taking your ecotrin/lovenox/eliquis? eliquis      Have you had a bowel movement since surgery? Yes last night      Are you wearing your MELANIE hose? yes      Are you doing ankle pumps?yes      Are you doing your incentive spirometry?yes       Any complaints/concerns?  none

## 2023-04-13 LAB
BACTERIA BLD CULT: NORMAL
BACTERIA BLD CULT: NORMAL

## 2023-04-25 DIAGNOSIS — Z96.652 STATUS POST TOTAL KNEE REPLACEMENT, LEFT: Primary | ICD-10-CM

## 2023-04-26 ENCOUNTER — OFFICE VISIT (OUTPATIENT)
Dept: ORTHOPEDICS | Facility: CLINIC | Age: 63
End: 2023-04-26
Payer: OTHER GOVERNMENT

## 2023-04-26 ENCOUNTER — HOSPITAL ENCOUNTER (OUTPATIENT)
Dept: RADIOLOGY | Facility: HOSPITAL | Age: 63
Discharge: HOME | End: 2023-04-26
Attending: ORTHOPAEDIC SURGERY
Payer: OTHER GOVERNMENT

## 2023-04-26 DIAGNOSIS — Z96.652 STATUS POST TOTAL KNEE REPLACEMENT USING CEMENT, LEFT: ICD-10-CM

## 2023-04-26 DIAGNOSIS — Z96.652 STATUS POST TOTAL KNEE REPLACEMENT, LEFT: Primary | ICD-10-CM

## 2023-04-26 DIAGNOSIS — Z96.652 STATUS POST TOTAL KNEE REPLACEMENT, LEFT: ICD-10-CM

## 2023-04-26 PROCEDURE — 73560 X-RAY EXAM OF KNEE 1 OR 2: CPT | Mod: 26,LT,, | Performed by: ORTHOPAEDIC SURGERY

## 2023-04-26 PROCEDURE — 99214 OFFICE O/P EST MOD 30 MIN: CPT | Mod: PBBFAC | Performed by: ORTHOPAEDIC SURGERY

## 2023-04-26 PROCEDURE — 99024 POSTOP FOLLOW-UP VISIT: CPT | Mod: ,,, | Performed by: ORTHOPAEDIC SURGERY

## 2023-04-26 PROCEDURE — 73560 X-RAY EXAM OF KNEE 1 OR 2: CPT | Mod: TC,LT

## 2023-04-26 PROCEDURE — 73560 XR KNEE 1 OR 2 VIEW LEFT: ICD-10-PCS | Mod: 26,LT,, | Performed by: ORTHOPAEDIC SURGERY

## 2023-04-26 PROCEDURE — 99024 PR POST-OP FOLLOW-UP VISIT: ICD-10-PCS | Mod: ,,, | Performed by: ORTHOPAEDIC SURGERY

## 2023-04-26 NOTE — PROGRESS NOTES
Is here for follow-up of his left total arthroplasty.  Staples were removed.  Wounds show no signs of infection.  Comes into full extension.  Let him continue with the therapy.  I will follow up 6 weeks.  Slight decreased sensation over lateral aspect of the wound.  Let him do outpatient therapy.

## 2023-04-26 NOTE — PROGRESS NOTES
Radiology Interpretation        Patient Name: Rex Hassan  Date: 4/26/2023  YOB: 1960  MRN# [id number]        ORDERING DIAGNOSIS:    Encounter Diagnoses   Name Primary?    Status post total knee replacement, left Yes    Status post total knee replacement using cement, left            Two views left knee skeletally mature individual total knee arthroplasty in place no loosening fractures or subluxations impression total knee arthroplasty in place left knee no loosening            Rex Joe MD

## 2023-06-02 DIAGNOSIS — Z96.652 STATUS POST TOTAL KNEE REPLACEMENT, LEFT: Primary | ICD-10-CM

## 2023-06-07 ENCOUNTER — OFFICE VISIT (OUTPATIENT)
Dept: ORTHOPEDICS | Facility: CLINIC | Age: 63
End: 2023-06-07
Payer: OTHER GOVERNMENT

## 2023-06-07 ENCOUNTER — HOSPITAL ENCOUNTER (OUTPATIENT)
Dept: RADIOLOGY | Facility: HOSPITAL | Age: 63
Discharge: HOME | End: 2023-06-07
Attending: ORTHOPAEDIC SURGERY
Payer: OTHER GOVERNMENT

## 2023-06-07 VITALS
OXYGEN SATURATION: 94 % | TEMPERATURE: 98 F | WEIGHT: 242 LBS | DIASTOLIC BLOOD PRESSURE: 80 MMHG | SYSTOLIC BLOOD PRESSURE: 112 MMHG | HEART RATE: 82 BPM | BODY MASS INDEX: 32.07 KG/M2 | HEIGHT: 73 IN

## 2023-06-07 DIAGNOSIS — Z96.652 STATUS POST TOTAL KNEE REPLACEMENT, LEFT: ICD-10-CM

## 2023-06-07 DIAGNOSIS — Z96.652 STATUS POST TOTAL KNEE REPLACEMENT, LEFT: Primary | ICD-10-CM

## 2023-06-07 PROCEDURE — 73560 XR KNEE 1 OR 2 VIEW LEFT: ICD-10-PCS | Mod: 26,LT,, | Performed by: ORTHOPAEDIC SURGERY

## 2023-06-07 PROCEDURE — 99024 POSTOP FOLLOW-UP VISIT: CPT | Mod: ,,, | Performed by: ORTHOPAEDIC SURGERY

## 2023-06-07 PROCEDURE — 99024 PR POST-OP FOLLOW-UP VISIT: ICD-10-PCS | Mod: ,,, | Performed by: ORTHOPAEDIC SURGERY

## 2023-06-07 PROCEDURE — 73560 X-RAY EXAM OF KNEE 1 OR 2: CPT | Mod: 26,LT,, | Performed by: ORTHOPAEDIC SURGERY

## 2023-06-07 PROCEDURE — 99215 OFFICE O/P EST HI 40 MIN: CPT | Mod: PBBFAC | Performed by: ORTHOPAEDIC SURGERY

## 2023-06-07 PROCEDURE — 73560 X-RAY EXAM OF KNEE 1 OR 2: CPT | Mod: TC,LT

## 2023-06-07 NOTE — PROGRESS NOTES
Radiology Interpretation        Patient Name: Rex Hassan  Date: 6/7/2023  YOB: 1960  MRN# [id number]        ORDERING DIAGNOSIS:    Encounter Diagnosis   Name Primary?    Status post total knee replacement, left Yes        Two views left knee skeletally mature individual total knee arthroplasty in place no loosening fractures subluxations no shift from previous views impression total knee arthroplasty in place left knee no loosening               Rex Joe MD

## 2023-06-07 NOTE — PROGRESS NOTES
Patient is here for follow-up of his left total knee arthroplasty.  His wounds are well healed.  He has good motion of the knee.  No instability.  Let him continue weightbear as tolerates and work on strengthening.  I will follow him up in 3 months.

## 2023-09-05 DIAGNOSIS — Z96.652 STATUS POST TOTAL KNEE REPLACEMENT, LEFT: Primary | ICD-10-CM

## 2023-09-11 ENCOUNTER — HOSPITAL ENCOUNTER (OUTPATIENT)
Dept: RADIOLOGY | Facility: HOSPITAL | Age: 63
Discharge: HOME | End: 2023-09-11
Attending: ORTHOPAEDIC SURGERY
Payer: MEDICARE

## 2023-09-11 ENCOUNTER — OFFICE VISIT (OUTPATIENT)
Dept: ORTHOPEDICS | Facility: CLINIC | Age: 63
End: 2023-09-11
Payer: MEDICARE

## 2023-09-11 VITALS — BODY MASS INDEX: 32.07 KG/M2 | WEIGHT: 242 LBS | HEIGHT: 73 IN

## 2023-09-11 DIAGNOSIS — Z96.652 STATUS POST TOTAL KNEE REPLACEMENT, LEFT: ICD-10-CM

## 2023-09-11 DIAGNOSIS — Z96.652 STATUS POST TOTAL KNEE REPLACEMENT, LEFT: Primary | ICD-10-CM

## 2023-09-11 PROCEDURE — 99213 OFFICE O/P EST LOW 20 MIN: CPT | Mod: S$PBB,,, | Performed by: ORTHOPAEDIC SURGERY

## 2023-09-11 PROCEDURE — 99213 PR OFFICE/OUTPT VISIT, EST, LEVL III, 20-29 MIN: ICD-10-PCS | Mod: S$PBB,,, | Performed by: ORTHOPAEDIC SURGERY

## 2023-09-11 PROCEDURE — 73560 X-RAY EXAM OF KNEE 1 OR 2: CPT | Mod: 26,LT,, | Performed by: ORTHOPAEDIC SURGERY

## 2023-09-11 PROCEDURE — 1159F MED LIST DOCD IN RCRD: CPT | Mod: CPTII,,, | Performed by: ORTHOPAEDIC SURGERY

## 2023-09-11 PROCEDURE — 4010F ACE/ARB THERAPY RXD/TAKEN: CPT | Mod: CPTII,,, | Performed by: ORTHOPAEDIC SURGERY

## 2023-09-11 PROCEDURE — 1159F PR MEDICATION LIST DOCUMENTED IN MEDICAL RECORD: ICD-10-PCS | Mod: CPTII,,, | Performed by: ORTHOPAEDIC SURGERY

## 2023-09-11 PROCEDURE — 99214 OFFICE O/P EST MOD 30 MIN: CPT | Mod: PBBFAC | Performed by: ORTHOPAEDIC SURGERY

## 2023-09-11 PROCEDURE — 73560 XR KNEE 1 OR 2 VIEW LEFT: ICD-10-PCS | Mod: 26,LT,, | Performed by: ORTHOPAEDIC SURGERY

## 2023-09-11 PROCEDURE — 73560 X-RAY EXAM OF KNEE 1 OR 2: CPT | Mod: TC,LT

## 2023-09-11 PROCEDURE — 4010F PR ACE/ARB THEARPY RXD/TAKEN: ICD-10-PCS | Mod: CPTII,,, | Performed by: ORTHOPAEDIC SURGERY

## 2023-09-11 PROCEDURE — 3008F PR BODY MASS INDEX (BMI) DOCUMENTED: ICD-10-PCS | Mod: CPTII,,, | Performed by: ORTHOPAEDIC SURGERY

## 2023-09-11 PROCEDURE — 3008F BODY MASS INDEX DOCD: CPT | Mod: CPTII,,, | Performed by: ORTHOPAEDIC SURGERY

## 2023-09-11 NOTE — PROGRESS NOTES
Radiology Interpretation        Patient Name: Rex Hassan  Date: 9/11/2023  YOB: 1960  MRN# [id number]        ORDERING DIAGNOSIS:    Encounter Diagnosis   Name Primary?    Status post total knee replacement, left Yes           Two views AP lateral left knee skeletally mature individual total knee arthroplasty in place no loosening fractures or subluxations no bony lesions impression total knee arthroplasty in place on left no loosening            Rex Joe MD

## 2023-09-11 NOTE — PROGRESS NOTES
Patient is here for follow-up of his left total knee arthroplasty.  He is now 6 months out.  He is doing well.  0 to past 100 10° of flexion.  Having minimal pain.  Let him weightbear as tolerates.  I will follow back up on a yearly basis.  He is doing well.  No instability of the knee.

## 2025-03-26 DIAGNOSIS — I50.9 HEART FAILURE, UNSPECIFIED: Primary | ICD-10-CM

## 2025-04-24 ENCOUNTER — OFFICE VISIT (OUTPATIENT)
Dept: CARDIOLOGY | Facility: CLINIC | Age: 65
End: 2025-04-24
Payer: OTHER GOVERNMENT

## 2025-04-24 VITALS
SYSTOLIC BLOOD PRESSURE: 102 MMHG | OXYGEN SATURATION: 94 % | WEIGHT: 239.63 LBS | BODY MASS INDEX: 31.76 KG/M2 | HEIGHT: 73 IN | DIASTOLIC BLOOD PRESSURE: 70 MMHG | HEART RATE: 97 BPM

## 2025-04-24 DIAGNOSIS — I25.119 CORONARY ARTERY DISEASE INVOLVING NATIVE CORONARY ARTERY OF NATIVE HEART WITH ANGINA PECTORIS: ICD-10-CM

## 2025-04-24 DIAGNOSIS — I50.9 CONGESTIVE HEART FAILURE, UNSPECIFIED HF CHRONICITY, UNSPECIFIED HEART FAILURE TYPE: Primary | ICD-10-CM

## 2025-04-24 DIAGNOSIS — I48.0 PAROXYSMAL ATRIAL FIBRILLATION: ICD-10-CM

## 2025-04-24 DIAGNOSIS — Z79.01 ON CONTINUOUS ORAL ANTICOAGULATION: ICD-10-CM

## 2025-04-24 DIAGNOSIS — R07.9 CHEST PAIN, UNSPECIFIED TYPE: ICD-10-CM

## 2025-04-24 DIAGNOSIS — I10 ESSENTIAL HYPERTENSION: ICD-10-CM

## 2025-04-24 DIAGNOSIS — R06.09 DYSPNEA ON EXERTION: ICD-10-CM

## 2025-04-24 DIAGNOSIS — I50.9 HEART FAILURE, UNSPECIFIED HF CHRONICITY, UNSPECIFIED HEART FAILURE TYPE: ICD-10-CM

## 2025-04-24 PROCEDURE — 99214 OFFICE O/P EST MOD 30 MIN: CPT | Mod: PBBFAC | Performed by: STUDENT IN AN ORGANIZED HEALTH CARE EDUCATION/TRAINING PROGRAM

## 2025-04-24 PROCEDURE — 99999 PR PBB SHADOW E&M-EST. PATIENT-LVL IV: CPT | Mod: PBBFAC,,, | Performed by: STUDENT IN AN ORGANIZED HEALTH CARE EDUCATION/TRAINING PROGRAM

## 2025-04-24 PROCEDURE — 99204 OFFICE O/P NEW MOD 45 MIN: CPT | Mod: S$PBB,,, | Performed by: STUDENT IN AN ORGANIZED HEALTH CARE EDUCATION/TRAINING PROGRAM

## 2025-04-24 RX ORDER — ROSUVASTATIN CALCIUM 10 MG/1
10 TABLET, COATED ORAL DAILY
Qty: 90 TABLET | Refills: 3 | Status: SHIPPED | OUTPATIENT
Start: 2025-04-24

## 2025-04-24 RX ORDER — METOPROLOL SUCCINATE 100 MG/1
100 TABLET, EXTENDED RELEASE ORAL DAILY
COMMUNITY
End: 2025-04-24 | Stop reason: SDUPTHER

## 2025-04-24 RX ORDER — METOPROLOL SUCCINATE 100 MG/1
100 TABLET, EXTENDED RELEASE ORAL DAILY
Qty: 90 TABLET | Refills: 3 | Status: SHIPPED | OUTPATIENT
Start: 2025-04-24

## 2025-04-24 RX ORDER — ASCORBIC ACID 500 MG
500 TABLET ORAL DAILY
COMMUNITY

## 2025-04-24 RX ORDER — LOSARTAN POTASSIUM 25 MG/1
25 TABLET ORAL NIGHTLY
Qty: 90 TABLET | Refills: 3 | Status: SHIPPED | OUTPATIENT
Start: 2025-04-24

## 2025-04-24 NOTE — PROGRESS NOTES
"PCP: Rex Joe MD    Referring Provider: Karlee Aguilera NP  [address] [facility] [address]    Reason for referral: CHF    Subjective:   Rex Hassan is a 65 y.o. male with hx of CHF, HTN and paroxysmal afib/flutter (on eliquis) who presents for a new patient visit.     Patient presents today for evaluation of congestive heart failure. He experiences dyspnea with showering and overexertion, but denies symptoms with regular daily activities. He reports intermittent ankle swelling that improves with elevation. He has had chest pain for 6-7 months that occurs while sitting and is not associated with exertion. The chest pain episodes typically occur with medication non-compliance, with the most recent episode 1 week ago. Chest pain is described as a chest tightness. Pain does not radiate.    [facility] [place] [facility]. Cardiac catheterization during this hospitalization revealed a stenosis in a small vessel per report. It was treated medically. He also has a history of arrhythmia. He recently obtained CPAP equipment and reports frequent nighttime awakenings for urination.He has experienced two self-limited episodes of nosebleeds this year, most recently last month.      Fhx:He reports possible family history of cardiac problems without specific details.  Shx: Never smoker     EKG 4/24/25: Sinus rhythm with PACs with occasional PVCs, ST and T wave abnormality, consider inferolateral ischemia     ECHO No results found for this or any previous visit.     CATH: No results found for this or any previous visit.     Lab Results   Component Value Date     04/24/2025    K 4.0 04/24/2025    CL 97 (L) 04/24/2025    CO2 30 04/24/2025    BUN 18 04/24/2025    CREATININE 1.21 04/24/2025    CALCIUM 9.7 04/24/2025    ANIONGAP 17 (H) 04/24/2025    EGFRNONAA 85 07/13/2022       No results found for: "CHOL"  No results found for: "HDL"  No results found for: "LDLCALC"  No results found " "for: "TRIG"  No results found for: "CHOLHDL"    Lab Results   Component Value Date    WBC 3.93 (L) 04/24/2025    HGB 14.6 04/24/2025    HCT 44.6 04/24/2025    MCV 90.5 04/24/2025     (L) 04/24/2025         Current Medications[1]    Review of Systems   Constitutional:  Negative for chills, diaphoresis, fever and malaise/fatigue.   Respiratory:  Positive for shortness of breath. Negative for cough.    Cardiovascular:  Positive for chest pain and leg swelling. Negative for palpitations, orthopnea, claudication and PND.   Gastrointestinal:  Negative for abdominal pain, heartburn, nausea and vomiting.   Neurological:  Negative for dizziness.       Objective:   /70 (BP Location: Left arm, Patient Position: Sitting)   Pulse 97   Ht 6' 1" (1.854 m)   Wt 108.7 kg (239 lb 9.6 oz)   SpO2 (!) 94%   BMI 31.61 kg/m²     Physical Exam  Constitutional:       General: He is not in acute distress.     Appearance: Normal appearance.   Cardiovascular:      Rate and Rhythm: Normal rate and regular rhythm.      Pulses: Normal pulses.      Heart sounds: Normal heart sounds. No murmur heard.     No friction rub. No gallop.   Pulmonary:      Effort: Pulmonary effort is normal.      Breath sounds: Normal breath sounds. No wheezing or rales.   Musculoskeletal:      Right lower leg: No edema.      Left lower leg: No edema.   Skin:     General: Skin is warm and dry.   Neurological:      Mental Status: He is alert.           Assessment:     1. Congestive heart failure, unspecified HF chronicity, unspecified heart failure type  EKG 12-lead    EKG 12-lead    Basic Metabolic Panel    NT-Pro Natriuretic Peptide    CBC Auto Differential    Echo      2. Essential hypertension        3. Heart failure, unspecified HF chronicity, unspecified heart failure type  Ambulatory referral/consult to Cardiology      4. On continuous oral anticoagulation  CBC Auto Differential      5. Paroxysmal atrial fibrillation        6. Chest pain, " unspecified type  NM Myocardial Perfusion Spect Multi Exer    Nuclear Stress Test      7. Dyspnea on exertion  Echo      8. Coronary artery disease involving native coronary artery of native heart with angina pectoris              Plan:   No problem-specific Assessment & Plan notes found for this encounter.        Assessment & Plan    I50.9 Heart failure, unspecified HF chronicity, unspecified heart failure type  I48.0 Paroxysmal atrial fibrillation  I10 Essential hypertension  Z79.01 On continuous oral anticoagulation  R07.9 Chest pain, unspecified type  R06.09 Dyspnea on exertion      HEART FAILURE, UNSPECIFIED HF CHRONICITY, UNSPECIFIED HEART FAILURE TYPE:  - Ordered echocardiogram to assess cardiac function and determine type of heart failure, if present.  - Ordered BMP and proBNP   - Continued Bumex 1 mg daily.  - GDMT: Continue Toprol 100 mg daily, losartan 100 mg daily and Jardiance 10 mg daily.    CAD OF NATIVE ARTERY OF NATIVE HEART WITH UNSPECIFIED ANGINA PECTORIS TYPE  CHEST PAIN, UNSPECIFIED TYPE:  ABNORMAL EKG   - EKG suggestive of inferolateral ischemia   - reported history of left heart catheterization 1 year ago with stenosis in a small vessel which was managed medically  - schedule exercise stress test with nuclear imaging to evaluate for ischemia    PAROXYSMAL ATRIAL FIBRILLATION:  - NSR on EKG today  - Continued Metoprolol 100 mg daily.  - Continue eliquis 5 mg BID  - Check CBC given intermittent episodes of mild epistaxis     ESSENTIAL HYPERTENSION:  - Medications as above       Personally performed medication reconciliation and sent refills for medications patient was about to run out of.     Follow up in 8 weeks.         This note was generated with the assistance of ambient listening technology. Verbal consent was obtained by the patient and accompanying visitor(s) for the recording of patient appointment to facilitate this note. I attest to having reviewed and edited the generated note for  accuracy, though some syntax or spelling errors may persist. Please contact the author of this note for any clarification.         [1]   Current Outpatient Medications:     apixaban (ELIQUIS) 5 mg Tab, 5 mg., Disp: , Rfl:     ascorbic acid, vitamin C, (VITAMIN C) 500 MG tablet, Take 500 mg by mouth once daily., Disp: , Rfl:     bumetanide (BUMEX) 1 MG tablet, 1 mg., Disp: , Rfl:     cholecalciferol, vitamin D3, (VITAMIN D3 ORAL), Take 400 Units by mouth once daily., Disp: , Rfl:     cyanocobalamin (VITAMIN B-12) 1000 MCG tablet, Take 1,000 mcg by mouth., Disp: , Rfl:     empagliflozin (JARDIANCE) 10 mg tablet, Take 1 tablet (10 mg total) by mouth once daily., Disp: 90 tablet, Rfl: 3    losartan (COZAAR) 25 MG tablet, Take 1 tablet (25 mg total) by mouth every evening., Disp: 90 tablet, Rfl: 3    metoprolol succinate (TOPROL-XL) 100 MG 24 hr tablet, Take 1 tablet (100 mg total) by mouth once daily., Disp: 90 tablet, Rfl: 3    rosuvastatin (CRESTOR) 10 MG tablet, Take 1 tablet (10 mg total) by mouth once daily., Disp: 90 tablet, Rfl: 3

## 2025-04-24 NOTE — PATIENT INSTRUCTIONS
Echo and stress test on-> May 14, 2025 at 8:00 am  Labs today   Obtain records from [facility]   Follow-up in 8 weeks

## 2025-05-07 ENCOUNTER — RESULTS FOLLOW-UP (OUTPATIENT)
Dept: CARDIOLOGY | Facility: CLINIC | Age: 65
End: 2025-05-07

## 2025-05-07 NOTE — PROGRESS NOTES
CBC is normal, improvement in Hemoglobin compared to 2 years ago. Mild proBNP elevation- await echo. BMP was normal. Thanks.

## 2025-05-13 ENCOUNTER — TELEPHONE (OUTPATIENT)
Dept: CARDIOLOGY | Facility: HOSPITAL | Age: 65
End: 2025-05-13
Payer: OTHER GOVERNMENT

## 2025-05-14 ENCOUNTER — HOSPITAL ENCOUNTER (OUTPATIENT)
Dept: RADIOLOGY | Facility: HOSPITAL | Age: 65
Discharge: HOME | End: 2025-05-14
Attending: STUDENT IN AN ORGANIZED HEALTH CARE EDUCATION/TRAINING PROGRAM
Payer: OTHER GOVERNMENT

## 2025-05-14 ENCOUNTER — HOSPITAL ENCOUNTER (OUTPATIENT)
Dept: CARDIOLOGY | Facility: HOSPITAL | Age: 65
Discharge: HOME | End: 2025-05-14
Attending: STUDENT IN AN ORGANIZED HEALTH CARE EDUCATION/TRAINING PROGRAM
Payer: OTHER GOVERNMENT

## 2025-05-14 DIAGNOSIS — R07.9 CHEST PAIN, UNSPECIFIED TYPE: ICD-10-CM

## 2025-05-14 DIAGNOSIS — R06.09 DYSPNEA ON EXERTION: ICD-10-CM

## 2025-05-14 DIAGNOSIS — I25.119 CORONARY ARTERY DISEASE INVOLVING NATIVE CORONARY ARTERY OF NATIVE HEART WITH ANGINA PECTORIS: ICD-10-CM

## 2025-05-14 DIAGNOSIS — I50.9 CONGESTIVE HEART FAILURE, UNSPECIFIED HF CHRONICITY, UNSPECIFIED HEART FAILURE TYPE: ICD-10-CM

## 2025-05-14 LAB
AORTIC ROOT ANNULUS: 2.1 CM
AORTIC VALVE CUSP SEPERATION: 1.81 CM
ASCENDING AORTA: 3.6 CM
AV INDEX (PROSTH): 0.67
AV MEAN GRADIENT: 7 MMHG
AV PEAK GRADIENT: 14 MMHG
AV REGURGITATION PRESSURE HALF TIME: 393 MS
AV VALVE AREA BY VELOCITY RATIO: 2 CM²
AV VALVE AREA: 2.3 CM²
AV VELOCITY RATIO: 0.58
CV ECHO LV RWT: 0.51 CM
CV STRESS BASE HR: 100 BPM
DIASTOLIC BLOOD PRESSURE: 81 MMHG
DOP CALC AO PEAK VEL: 1.9 M/S
DOP CALC AO VTI: 28.1 CM
DOP CALC LVOT AREA: 3.5 CM2
DOP CALC LVOT DIAMETER: 2.1 CM
DOP CALC LVOT PEAK VEL: 1.1 M/S
DOP CALC LVOT STROKE VOLUME: 65.4 CM3
DOP CALC MV VTI: 19.2 CM
DOP CALCLVOT PEAK VEL VTI: 18.9 CM
E WAVE DECELERATION TIME: 94 MSEC
E/A RATIO: 0.8
E/E' RATIO: 10 M/S
ECHO LV POSTERIOR WALL: 1.2 CM (ref 0.6–1.1)
FRACTIONAL SHORTENING: 36.2 % (ref 28–44)
INTERVENTRICULAR SEPTUM: 1.2 CM (ref 0.6–1.1)
IVRT: 84 MSEC
LEFT ATRIUM AREA SYSTOLIC (APICAL 2 CHAMBER): 19.85 CM2
LEFT ATRIUM AREA SYSTOLIC (APICAL 4 CHAMBER): 18.64 CM2
LEFT ATRIUM SIZE: 3.1 CM
LEFT ATRIUM VOLUME MOD: 52 ML
LEFT INTERNAL DIMENSION IN SYSTOLE: 3 CM (ref 2.1–4)
LEFT VENTRICLE DIASTOLIC VOLUME: 104 ML
LEFT VENTRICLE END SYSTOLIC VOLUME APICAL 2 CHAMBER: 54.83 ML
LEFT VENTRICLE END SYSTOLIC VOLUME APICAL 4 CHAMBER: 48.35 ML
LEFT VENTRICLE SYSTOLIC VOLUME: 35 ML
LEFT VENTRICULAR INTERNAL DIMENSION IN DIASTOLE: 4.7 CM (ref 3.5–6)
LEFT VENTRICULAR MASS: 212 G
LV LATERAL E/E' RATIO: 8.9 M/S
LV SEPTAL E/E' RATIO: 11.4 M/S
LVED V (TEICH): 104.08 ML
LVES V (TEICH): 34.77 ML
LVOT MG: 2.25 MMHG
LVOT MV: 0.7 CM/S
MV MEAN GRADIENT: 2 MMHG
MV PEAK A VEL: 1 M/S
MV PEAK E VEL: 0.8 M/S
MV PEAK GRADIENT: 4 MMHG
MV STENOSIS PRESSURE HALF TIME: 27.34 MS
MV VALVE AREA BY CONTINUITY EQUATION: 3.41 CM2
MV VALVE AREA P 1/2 METHOD: 8.05 CM2
OHS CV CPX 85 PERCENT MAX PREDICTED HEART RATE MALE: 131
OHS CV CPX ESTIMATED METS: 5.3
OHS CV CPX MAX PREDICTED HEART RATE: 154
OHS CV CPX PATIENT IS FEMALE: 0
OHS CV CPX PATIENT IS MALE: 1
OHS CV CPX PEAK DIASTOLIC BLOOD PRESSURE: 80 MMHG
OHS CV CPX PEAK HEAR RATE: 162 BPM
OHS CV CPX PEAK RATE PRESSURE PRODUCT: NORMAL
OHS CV CPX PEAK SYSTOLIC BLOOD PRESSURE: 185 MMHG
OHS CV CPX PERCENT MAX PREDICTED HEART RATE ACHIEVED: 105
OHS CV CPX RATE PRESSURE PRODUCT PRESENTING: NORMAL
OHS CV RV/LV RATIO: 0.68 CM
PISA AR MAX VEL: 3.91 M/S
PISA TR MAX VEL: 2.6 M/S
PV MV: 0.55 M/S
PV PEAK GRADIENT: 2 MMHG
PV PEAK VELOCITY: 0.78 M/S
RA VOL SYS: 33.1 ML
RIGHT ATRIAL AREA: 14.1 CM2
RIGHT ATRIUM VOLUME AREA LENGTH APICAL 4 CHAMBER: 30.74 ML
RIGHT VENTRICLE DIASTOLIC BASEL DIMENSION: 3.2 CM
RIGHT VENTRICLE DIASTOLIC LENGTH: 6.3 CM
RIGHT VENTRICLE DIASTOLIC MID DIMENSION: 2.5 CM
RIGHT VENTRICULAR END-DIASTOLIC DIMENSION: 3.15 CM
RIGHT VENTRICULAR LENGTH IN DIASTOLE (APICAL 4-CHAMBER VIEW): 6.31 CM
RV MID DIAMA: 2.52 CM
STJ: 3.3 CM
STRESS ECHO POST EXERCISE DUR MIN: 5 MINUTES
STRESS ECHO POST EXERCISE DUR SEC: 2 SECONDS
SYSTOLIC BLOOD PRESSURE: 142 MMHG
TDI LATERAL: 0.09 M/S
TDI SEPTAL: 0.07 M/S
TDI: 0.08 M/S
TR MAX PG: 26 MMHG

## 2025-05-14 PROCEDURE — 93306 TTE W/DOPPLER COMPLETE: CPT

## 2025-05-14 PROCEDURE — A9500 TC99M SESTAMIBI: HCPCS | Performed by: STUDENT IN AN ORGANIZED HEALTH CARE EDUCATION/TRAINING PROGRAM

## 2025-05-14 PROCEDURE — 78452 HT MUSCLE IMAGE SPECT MULT: CPT | Mod: TC

## 2025-05-14 PROCEDURE — 93017 CV STRESS TEST TRACING ONLY: CPT

## 2025-05-14 PROCEDURE — 78452 HT MUSCLE IMAGE SPECT MULT: CPT | Mod: 26,,, | Performed by: STUDENT IN AN ORGANIZED HEALTH CARE EDUCATION/TRAINING PROGRAM

## 2025-05-14 RX ORDER — TETRAKIS(2-METHOXYISOBUTYLISOCYANIDE)COPPER(I) TETRAFLUOROBORATE 1 MG/ML
36 INJECTION, POWDER, LYOPHILIZED, FOR SOLUTION INTRAVENOUS
Status: COMPLETED | OUTPATIENT
Start: 2025-05-14 | End: 2025-05-14

## 2025-05-14 RX ORDER — TETRAKIS(2-METHOXYISOBUTYLISOCYANIDE)COPPER(I) TETRAFLUOROBORATE 1 MG/ML
11.2 INJECTION, POWDER, LYOPHILIZED, FOR SOLUTION INTRAVENOUS
Status: COMPLETED | OUTPATIENT
Start: 2025-05-14 | End: 2025-05-14

## 2025-05-14 RX ADMIN — KIT FOR THE PREPARATION OF TECHNETIUM TC99M SESTAMIBI 11.2 MILLICURIE: 1 INJECTION, POWDER, LYOPHILIZED, FOR SOLUTION PARENTERAL at 08:05

## 2025-05-14 RX ADMIN — KIT FOR THE PREPARATION OF TECHNETIUM TC99M SESTAMIBI 36 MILLICURIE: 1 INJECTION, POWDER, LYOPHILIZED, FOR SOLUTION PARENTERAL at 10:05

## 2025-05-22 NOTE — PROGRESS NOTES
Echo and stress test with mildly reduced heart function. Stress test with area of scar, no area of ischemia. Further discussion at time of scheduled f/u next month. Thanks.   Message was sent to the ordering provider about changing the referral to an e-consult referral, which will be a video or phone visit with a hematologist in the [facility].  They should call the patient to set up once the e-consult referral is in.

## 2025-06-19 ENCOUNTER — RESULTS FOLLOW-UP (OUTPATIENT)
Dept: CARDIOLOGY | Facility: CLINIC | Age: 65
End: 2025-06-19
Payer: OTHER GOVERNMENT

## 2025-06-19 ENCOUNTER — OFFICE VISIT (OUTPATIENT)
Dept: CARDIOLOGY | Facility: CLINIC | Age: 65
End: 2025-06-19
Payer: OTHER GOVERNMENT

## 2025-06-19 VITALS
OXYGEN SATURATION: 95 % | WEIGHT: 236 LBS | SYSTOLIC BLOOD PRESSURE: 130 MMHG | HEART RATE: 75 BPM | HEIGHT: 73 IN | DIASTOLIC BLOOD PRESSURE: 70 MMHG | BODY MASS INDEX: 31.28 KG/M2

## 2025-06-19 DIAGNOSIS — I25.119 CORONARY ARTERY DISEASE INVOLVING NATIVE CORONARY ARTERY OF NATIVE HEART WITH ANGINA PECTORIS: Primary | ICD-10-CM

## 2025-06-19 PROCEDURE — 99214 OFFICE O/P EST MOD 30 MIN: CPT | Mod: PBBFAC | Performed by: STUDENT IN AN ORGANIZED HEALTH CARE EDUCATION/TRAINING PROGRAM

## 2025-06-19 PROCEDURE — 99999 PR PBB SHADOW E&M-EST. PATIENT-LVL IV: CPT | Mod: PBBFAC,,, | Performed by: STUDENT IN AN ORGANIZED HEALTH CARE EDUCATION/TRAINING PROGRAM

## 2025-06-19 PROCEDURE — 99214 OFFICE O/P EST MOD 30 MIN: CPT | Mod: S$PBB,,, | Performed by: STUDENT IN AN ORGANIZED HEALTH CARE EDUCATION/TRAINING PROGRAM

## 2025-06-19 RX ORDER — AMLODIPINE BESYLATE 5 MG/1
5 TABLET ORAL DAILY
Qty: 90 TABLET | Refills: 3 | Status: SHIPPED | OUTPATIENT
Start: 2025-06-19 | End: 2026-06-19

## 2025-06-19 RX ORDER — BUMETANIDE 1 MG/1
1 TABLET ORAL DAILY
Qty: 90 TABLET | Refills: 3 | Status: SHIPPED | OUTPATIENT
Start: 2025-06-19

## 2025-06-19 RX ORDER — ROSUVASTATIN CALCIUM 40 MG/1
40 TABLET, COATED ORAL NIGHTLY
Qty: 90 TABLET | Refills: 3 | Status: SHIPPED | OUTPATIENT
Start: 2025-06-19 | End: 2026-06-19

## 2025-06-19 NOTE — PROGRESS NOTES
Fasting LDL is above goal. Recommend increasing rosuvastatin (Crestor) to 40 mg daily. Sent to [facility] pharmacy. Repeat fasting lipid panel in 3 months- orders placed. Thanks.

## 2025-06-19 NOTE — PROGRESS NOTES
PCP: Rex Joe MD    Referring Provider: No referring provider defined for this encounter.    Reason for referral: CHF    Subjective:   Rex Hassan is a 65 y.o. male with hx of CHF, HTN and paroxysmal afib/flutter (on eliquis) who presents for a new patient visit.           SUBJECTIVE       History of Present Illness    CHIEF COMPLAINT:  Patient presents today for follow up of cardiac concerns.    CARDIAC SYMPTOMS:  He reports experiencing chest tightness over the past couple nights while sitting, lasting 1 minute and resolving with relaxation. He denies chest tightness with activity or lying down. Recent cardiac testing revealed EF of 40-50%, indicating mild to moderate left ventricular systolic dysfunction. Stress test showed a small area of decreased uptake, potentially representing an old stenosis in a small branch vessel.    LOWER EXTREMITY EDEMA:  He reports ankle swelling that extends to knees when standing for prolonged periods without elevation. He denies associated respiratory symptoms and reports normal breathing.    NEUROLOGICAL:  He experiences mild, intermittent hand tremors that are not daily occurrences. He is not taking any medications for tremor management but is open to neurological evaluation if symptoms progress.    ALLERGIES:  He has a medication allergy to Lisinopril with previous reaction of tongue swelling many years ago.      ROS:  General: -fever, -chills, -fatigue, -weight gain, -weight loss  Eyes: -vision changes, -redness, -discharge  ENT: -ear pain, -nasal congestion, -sore throat  Cardiovascular: -chest pain, -palpitations, +lower extremity edema, +chest tightness  Respiratory: -cough, -shortness of breath  Gastrointestinal: -abdominal pain, -nausea, -vomiting, -diarrhea, -constipation, -blood in stool  Genitourinary: -dysuria, -hematuria, -frequency  Musculoskeletal: -joint pain, -muscle pain, +joint swelling  Skin: -rash, -lesion  Neurological: -headache, -dizziness,  -numbness, -tingling, +tremors, +involuntary movements  Psychiatric: -anxiety, -depression, -sleep difficulty             ASSESSMENT & PLAN     Assessment & Plan    I25.119 Coronary artery disease involving native coronary artery of native heart with angina pectoris    IMPRESSION:  - Left ventricular EF mildly reduced at 45% (between 40-50%).  - Small area of reduced perfusion on stress test, likely representing old stenosis rather than new.  - Optimized medical management with anti-anginal medication before considering repeat cardiac catheterization.  - Will reassess need for catheterization based on response to medication adjustments over next few weeks.    CORONARY ARTERY DISEASE INVOLVING NATIVE CORONARY ARTERY OF NATIVE HEART WITH ANGINA PECTORIS:  - Explained the concept of anti-anginal medications and their role in preventing chest tightness and pain.  - Started Norvasc (Amlodipine) as an anti-anginal medication.  - Follow up in 8 weeks to assess response to new anti-anginal medication and ongoing chest tightness.  - Discussed the importance of cholesterol management in cardiovascular health, mentioning target LDL level of less than 70 for patients with existing heart disease.  - Continued Crestor 10 mg, pending lipid panel results.  - Ordered fasting lipid panel.  - Contact office if lipid panel results require medication adjustments.  - Refilled Eliquis: 90-day supply with 180 tablets.  - Refilled Bumex (diuretic): 90-day supply with 3 refills.  - Continued metoprolol, losartan, and Jardiance.            This note was generated with the assistance of ambient listening technology. Verbal consent was obtained by the patient and accompanying visitor(s) for the recording of patient appointment to facilitate this note. I attest to having reviewed and edited the generated note for accuracy, though some syntax or spelling errors may persist. Please contact the author of this note for any clarification.    "      Patient presents today for evaluation of congestive heart failure. He experiences dyspnea with showering and overexertion, but denies symptoms with regular daily activities. He reports intermittent ankle swelling that improves with elevation. He has had chest pain for 6-7 months that occurs while sitting and is not associated with exertion. The chest pain episodes typically occur with medication non-compliance, with the most recent episode 1 week ago. Chest pain is described as a chest tightness. Pain does not radiate.    [facility] [place] [facility]. Cardiac catheterization during this hospitalization revealed a stenosis in a small vessel per report. It was treated medically. He also has a history of arrhythmia. He recently obtained CPAP equipment and reports frequent nighttime awakenings for urination.He has experienced two self-limited episodes of nosebleeds this year, most recently last month.      Fhx:He reports possible family history of cardiac problems without specific details.  Shx: Never smoker     EKG 4/24/25: Sinus rhythm with PACs with occasional PVCs, ST and T wave abnormality, consider inferolateral ischemia     ECHO No results found for this or any previous visit.     CATH: No results found for this or any previous visit.     Lab Results   Component Value Date     04/24/2025    K 4.0 04/24/2025    CL 97 (L) 04/24/2025    CO2 30 04/24/2025    BUN 18 04/24/2025    CREATININE 1.21 04/24/2025    CALCIUM 9.7 04/24/2025    ANIONGAP 17 (H) 04/24/2025    EGFRNONAA 85 07/13/2022       No results found for: "CHOL"  No results found for: "HDL"  No results found for: "LDLCALC"  No results found for: "TRIG"  No results found for: "CHOLHDL"    Lab Results   Component Value Date    WBC 3.93 (L) 04/24/2025    HGB 14.6 04/24/2025    HCT 44.6 04/24/2025    MCV 90.5 04/24/2025     (L) 04/24/2025         Current Medications[1]    Review of Systems   Constitutional:  Negative " "for chills, diaphoresis, fever and malaise/fatigue.   Respiratory:  Positive for shortness of breath. Negative for cough.    Cardiovascular:  Positive for chest pain and leg swelling. Negative for palpitations, orthopnea, claudication and PND.   Gastrointestinal:  Negative for abdominal pain, heartburn, nausea and vomiting.   Neurological:  Negative for dizziness.       Objective:   /70 (BP Location: Left arm, Patient Position: Sitting)   Pulse 75   Ht 6' 1" (1.854 m)   Wt 107 kg (236 lb)   SpO2 95%   BMI 31.14 kg/m²     Physical Exam  Constitutional:       General: He is not in acute distress.     Appearance: Normal appearance.   Cardiovascular:      Rate and Rhythm: Normal rate and regular rhythm.      Pulses: Normal pulses.      Heart sounds: Normal heart sounds. No murmur heard.     No friction rub. No gallop.   Pulmonary:      Effort: Pulmonary effort is normal.      Breath sounds: Normal breath sounds. No wheezing or rales.   Musculoskeletal:      Right lower leg: No edema.      Left lower leg: No edema.   Skin:     General: Skin is warm and dry.   Neurological:      Mental Status: He is alert.         Assessment:     No diagnosis found.        Plan:   No problem-specific Assessment & Plan notes found for this encounter.        Assessment & Plan    I50.9 Heart failure, unspecified HF chronicity, unspecified heart failure type  I48.0 Paroxysmal atrial fibrillation  I10 Essential hypertension  Z79.01 On continuous oral anticoagulation  R07.9 Chest pain, unspecified type  R06.09 Dyspnea on exertion      HEART FAILURE, UNSPECIFIED HF CHRONICITY, UNSPECIFIED HEART FAILURE TYPE:  - Ordered echocardiogram to assess cardiac function and determine type of heart failure, if present.  - Ordered BMP and proBNP   - Continued Bumex 1 mg daily.  - GDMT: Continue Toprol 100 mg daily, losartan 100 mg daily and Jardiance 10 mg daily.    CAD OF NATIVE ARTERY OF NATIVE HEART WITH UNSPECIFIED ANGINA PECTORIS TYPE  CHEST " PAIN, UNSPECIFIED TYPE:  ABNORMAL EKG   - EKG suggestive of inferolateral ischemia   - reported history of left heart catheterization 1 year ago with stenosis in a small vessel which was managed medically  - schedule exercise stress test with nuclear imaging to evaluate for ischemia    PAROXYSMAL ATRIAL FIBRILLATION:  - NSR on EKG today  - Continued Metoprolol 100 mg daily.  - Continue eliquis 5 mg BID  - Check CBC given intermittent episodes of mild epistaxis     ESSENTIAL HYPERTENSION:  - Medications as above       Personally performed medication reconciliation and sent refills for medications patient was about to run out of.     Follow up in 8 weeks.         This note was generated with the assistance of ambient listening technology. Verbal consent was obtained by the patient and accompanying visitor(s) for the recording of patient appointment to facilitate this note. I attest to having reviewed and edited the generated note for accuracy, though some syntax or spelling errors may persist. Please contact the author of this note for any clarification.             [1]   Current Outpatient Medications:     apixaban (ELIQUIS) 5 mg Tab, 5 mg., Disp: , Rfl:     ascorbic acid, vitamin C, (VITAMIN C) 500 MG tablet, Take 500 mg by mouth once daily., Disp: , Rfl:     bumetanide (BUMEX) 1 MG tablet, 1 mg., Disp: , Rfl:     cholecalciferol, vitamin D3, (VITAMIN D3 ORAL), Take 400 Units by mouth once daily., Disp: , Rfl:     cyanocobalamin (VITAMIN B-12) 1000 MCG tablet, Take 1,000 mcg by mouth., Disp: , Rfl:     empagliflozin (JARDIANCE) 10 mg tablet, Take 1 tablet (10 mg total) by mouth once daily., Disp: 90 tablet, Rfl: 3    losartan (COZAAR) 25 MG tablet, Take 1 tablet (25 mg total) by mouth every evening., Disp: 90 tablet, Rfl: 3    metoprolol succinate (TOPROL-XL) 100 MG 24 hr tablet, Take 1 tablet (100 mg total) by mouth once daily., Disp: 90 tablet, Rfl: 3    rosuvastatin (CRESTOR) 10 MG tablet, Take 1 tablet (10 mg  total) by mouth once daily., Disp: 90 tablet, Rfl: 3

## 2025-06-19 NOTE — PATIENT INSTRUCTIONS
Start amlodipine 5 mg daily   Fasting lipid  panel today  Follow-up with Cardiology NP in 8 weeks

## 2025-06-30 ENCOUNTER — NURSE TRIAGE (OUTPATIENT)
Dept: ADMINISTRATIVE | Facility: CLINIC | Age: 65
End: 2025-06-30

## 2025-06-30 NOTE — TELEPHONE ENCOUNTER
"Patient calling to verify the instructions he was given about increasing his crestor. Per note in Epic... advised patient...."Recommend increasing rosuvastatin (Crestor) to 40 mg daily. Sent to [facility] pharmacy. " All questions answered. Instructed to call back with additional questions or worsening of symptoms. Patient verbalized understanding.       Reason for Disposition   Follow-up information-only call to recent contact, no triage required    Protocols used: Information Only Call - No Triage-A-[place]    "

## 2025-08-14 ENCOUNTER — OFFICE VISIT (OUTPATIENT)
Dept: CARDIOLOGY | Facility: CLINIC | Age: 65
End: 2025-08-14
Payer: OTHER GOVERNMENT

## 2025-08-14 VITALS
HEART RATE: 75 BPM | WEIGHT: 232.13 LBS | BODY MASS INDEX: 31.44 KG/M2 | SYSTOLIC BLOOD PRESSURE: 136 MMHG | DIASTOLIC BLOOD PRESSURE: 92 MMHG | OXYGEN SATURATION: 98 % | HEIGHT: 72 IN

## 2025-08-14 DIAGNOSIS — I50.22 HEART FAILURE WITH MILDLY REDUCED EJECTION FRACTION (HFMREF): ICD-10-CM

## 2025-08-14 DIAGNOSIS — E78.5 DYSLIPIDEMIA: ICD-10-CM

## 2025-08-14 DIAGNOSIS — I48.0 PAROXYSMAL ATRIAL FIBRILLATION: ICD-10-CM

## 2025-08-14 DIAGNOSIS — I10 ESSENTIAL HYPERTENSION: ICD-10-CM

## 2025-08-14 DIAGNOSIS — I25.119 CORONARY ARTERY DISEASE INVOLVING NATIVE CORONARY ARTERY OF NATIVE HEART WITH ANGINA PECTORIS: Primary | ICD-10-CM

## 2025-08-14 PROCEDURE — 99213 OFFICE O/P EST LOW 20 MIN: CPT | Mod: S$PBB,,, | Performed by: REGISTERED NURSE

## 2025-08-14 PROCEDURE — 99999 PR PBB SHADOW E&M-EST. PATIENT-LVL IV: CPT | Mod: PBBFAC,,, | Performed by: REGISTERED NURSE

## 2025-08-14 PROCEDURE — 99214 OFFICE O/P EST MOD 30 MIN: CPT | Mod: PBBFAC | Performed by: REGISTERED NURSE

## 2025-08-14 RX ORDER — AMLODIPINE BESYLATE 5 MG/1
10 TABLET ORAL DAILY
Qty: 180 TABLET | Refills: 3 | Status: SHIPPED | OUTPATIENT
Start: 2025-08-14 | End: 2026-08-14

## (undated) DEVICE — GLOVE SURGICAL PROTEXIS PI BLUE SIZE 7.0

## (undated) DEVICE — CANISTER SUCTION 12000ML DISPOSABLE

## (undated) DEVICE — TOURNIQUET CUFF DISP QC 44 INCH

## (undated) DEVICE — SUTURE VICRYL 2-0 CT-1 27"

## (undated) DEVICE — GLOVE 7.0 PROTEXIS PI BLUE

## (undated) DEVICE — GLOVE BIOGEL SKINSENSE PI 8.5

## (undated) DEVICE — COMPR KNEE STRAIGHT STAY 20IN

## (undated) DEVICE — SOL NACL IRR 1000ML BTL

## (undated) DEVICE — MIXER CEMENT SYSTEM W/ FEM BREAKAWAY NOZZLE

## (undated) DEVICE — DRESSING AQUACEL FOAM RECT 6X6

## (undated) DEVICE — SUT VICRYL CTD 1 27IN CP

## (undated) DEVICE — CDS KNEE TOTAL

## (undated) DEVICE — GLOVE 6.5 PROTEXIS PI BLUE

## (undated) DEVICE — BATTERY NAVIGATION

## (undated) DEVICE — GLOVE SURGICAL PROTEXIS PI BLUE SIZE 8.5

## (undated) DEVICE — SUT 2-0 VICRYL / CT-1

## (undated) DEVICE — DRAPE INCISE IOBAN 2 23X23IN

## (undated) DEVICE — SOL NACL IRR 3000ML

## (undated) DEVICE — GLOVE BIOGEL SKINSENSE PI 8.0

## (undated) DEVICE — SOL IRRIGATION SALINE 3000ML BAG

## (undated) DEVICE — BATTERY INSTRUMENT

## (undated) DEVICE — KIT TOTAL KNEE RUSH

## (undated) DEVICE — SPONGE GAUZE 4X4 12 PLY STL AMD 10/TRAY

## (undated) DEVICE — APPLICATOR CHLORAPREP HI-LITE TINTED ORANGE 26ML

## (undated) DEVICE — PADDING CAST SPECIALIST SYNTHETIC 4IN X 4YD STERILE

## (undated) DEVICE — GAUZE SPONGE 4X4 12PLY

## (undated) DEVICE — KIT EVACUATOR 3 SPR  DRN 400CC

## (undated) DEVICE — GLOVE BIOGEL SKINSENSE PI 7.0

## (undated) DEVICE — SUTURE VICRYL 1 CP UD 27IN

## (undated) DEVICE — PENCIL ELECTROSURG HOLST W/BLD

## (undated) DEVICE — GLOVE SURGICAL PROTEXIS PI CLASSIC SIZE 7.0

## (undated) DEVICE — GLOVE BIOGEL SKINSENSE PI 6.5

## (undated) DEVICE — BLADE PERFORMANCE SAG 21X90MM

## (undated) DEVICE — GLOVE SURGICAL PROTEXIS PI CLASSIC SIZE 8.0

## (undated) DEVICE — GUIDEPIN 3.20MM 4 KANT SQUARE
Type: IMPLANTABLE DEVICE | Site: KNEE | Status: NON-FUNCTIONAL
Removed: 2022-07-12

## (undated) DEVICE — IRRIGATOR INTERPULSE HANDPIECE SET

## (undated) DEVICE — Device

## (undated) DEVICE — THERAPY COLD UNIT COMBO SHOULDER AND KNEE

## (undated) DEVICE — KIT IRR SUCTION HND PIECE

## (undated) DEVICE — GLOVE SURGICAL PROTEXIS PI CLASSIC SIZE 7.5

## (undated) DEVICE — DRESSING AQUACEL A/G ADVANTAGE ANTIMICROBIAL 6 X 6IN

## (undated) DEVICE — FILM IOBAN ANTIMICROBL 60X60CM

## (undated) DEVICE — CARD UNIV KNEE NAVGTN SW-SCL L

## (undated) DEVICE — GLOVE SURGICAL PROTEXIS PI BLUE SIZE 7.5

## (undated) DEVICE — STAPLER SKIN PROXIMATE PLUS MD 35 REG DISP

## (undated) DEVICE — BLADE REAMER PATELLA SZ 46

## (undated) DEVICE — SOL IRRIGATION SALINE 0.9% 1000ML BOTTLE

## (undated) DEVICE — WOUND DRAINAGE KIT MEDIUM 10

## (undated) DEVICE — APPLICATOR CHLORAPREP ORN 26ML

## (undated) DEVICE — CANISTER SUCTION MEDI-VAC 12L

## (undated) DEVICE — GOWN TOGA SYS PEELWY ZIP 2 XL

## (undated) DEVICE — GLOVE 8.5 PROTEXIS PI BLUE

## (undated) DEVICE — BLADE SAGITTAL 21MM EDGE 90X1.27MM

## (undated) DEVICE — SYS REVOLUTION CEMENT MIXING

## (undated) DEVICE — NAVIGATION KEY TOTAL KNEE FEE

## (undated) DEVICE — GUIDEPIN 3.20MM 4 KANT SQUARE
Type: IMPLANTABLE DEVICE | Site: KNEE | Status: NON-FUNCTIONAL
Removed: 2023-04-06

## (undated) DEVICE — PAD CAST SPECIALIST STRL 3

## (undated) DEVICE — TOURNIQUET SB QC SP 34X4IN

## (undated) DEVICE — OVERLAY MATTRESS WAFFLE

## (undated) DEVICE — IMMOBILIZER KNEE CUTAWAY 20IN